# Patient Record
Sex: MALE | Race: WHITE | NOT HISPANIC OR LATINO | Employment: OTHER | URBAN - METROPOLITAN AREA
[De-identification: names, ages, dates, MRNs, and addresses within clinical notes are randomized per-mention and may not be internally consistent; named-entity substitution may affect disease eponyms.]

---

## 2023-04-13 LAB
ESTIMATED AVERAGE GLUCOSE FOR HBA1C: 120 MG/DL
HEMOGLOBIN A1C/HEMOGLOBIN TOTAL IN BLOOD: 5.8 %
THYROTROPIN (MIU/L) IN SER/PLAS BY DETECTION LIMIT <= 0.05 MIU/L: 1.91 MIU/L (ref 0.44–3.98)

## 2023-04-14 LAB
ALANINE AMINOTRANSFERASE (SGPT) (U/L) IN SER/PLAS: 26 U/L (ref 10–52)
ALBUMIN (G/DL) IN SER/PLAS: 4 G/DL (ref 3.4–5)
ALKALINE PHOSPHATASE (U/L) IN SER/PLAS: 91 U/L (ref 33–136)
ANION GAP IN SER/PLAS: 14 MMOL/L (ref 10–20)
ASPARTATE AMINOTRANSFERASE (SGOT) (U/L) IN SER/PLAS: 21 U/L (ref 9–39)
BILIRUBIN TOTAL (MG/DL) IN SER/PLAS: 0.6 MG/DL (ref 0–1.2)
CALCIUM (MG/DL) IN SER/PLAS: 9.4 MG/DL (ref 8.6–10.6)
CARBON DIOXIDE, TOTAL (MMOL/L) IN SER/PLAS: 24 MMOL/L (ref 21–32)
CHLORIDE (MMOL/L) IN SER/PLAS: 107 MMOL/L (ref 98–107)
CHOLESTEROL (MG/DL) IN SER/PLAS: 197 MG/DL (ref 0–199)
CHOLESTEROL IN HDL (MG/DL) IN SER/PLAS: 40.3 MG/DL
CHOLESTEROL/HDL RATIO: 4.9
CREATININE (MG/DL) IN SER/PLAS: 1.43 MG/DL (ref 0.5–1.3)
GFR MALE: 51 ML/MIN/1.73M2
GLUCOSE (MG/DL) IN SER/PLAS: 105 MG/DL (ref 74–99)
LDL: 95 MG/DL (ref 0–99)
NON HDL CHOLESTEROL: 157 MG/DL
POTASSIUM (MMOL/L) IN SER/PLAS: 4.7 MMOL/L (ref 3.5–5.3)
PROTEIN TOTAL: 6.6 G/DL (ref 6.4–8.2)
SODIUM (MMOL/L) IN SER/PLAS: 140 MMOL/L (ref 136–145)
TRIGLYCERIDE (MG/DL) IN SER/PLAS: 309 MG/DL (ref 0–149)
UREA NITROGEN (MG/DL) IN SER/PLAS: 22 MG/DL (ref 6–23)
VLDL: 62 MG/DL (ref 0–40)

## 2023-05-08 ENCOUNTER — HOSPITAL ENCOUNTER (OUTPATIENT)
Dept: DATA CONVERSION | Facility: HOSPITAL | Age: 73
End: 2023-05-08
Attending: SURGERY | Admitting: SURGERY
Payer: MEDICARE

## 2023-05-08 DIAGNOSIS — Z85.46 PERSONAL HISTORY OF MALIGNANT NEOPLASM OF PROSTATE: ICD-10-CM

## 2023-05-08 DIAGNOSIS — C4A.62 MERKEL CELL CARCINOMA OF LEFT UPPER LIMB, INCLUDING SHOULDER (MULTI): ICD-10-CM

## 2023-05-08 DIAGNOSIS — Z87.891 PERSONAL HISTORY OF NICOTINE DEPENDENCE: ICD-10-CM

## 2023-05-08 DIAGNOSIS — M19.90 UNSPECIFIED OSTEOARTHRITIS, UNSPECIFIED SITE: ICD-10-CM

## 2023-05-08 DIAGNOSIS — E78.5 HYPERLIPIDEMIA, UNSPECIFIED: ICD-10-CM

## 2023-05-08 DIAGNOSIS — E03.9 HYPOTHYROIDISM, UNSPECIFIED: ICD-10-CM

## 2023-05-08 DIAGNOSIS — E66.9 OBESITY, UNSPECIFIED: ICD-10-CM

## 2023-05-08 DIAGNOSIS — K21.9 GASTRO-ESOPHAGEAL REFLUX DISEASE WITHOUT ESOPHAGITIS: ICD-10-CM

## 2023-05-08 DIAGNOSIS — D50.9 IRON DEFICIENCY ANEMIA, UNSPECIFIED: ICD-10-CM

## 2023-05-08 DIAGNOSIS — N18.30 CHRONIC KIDNEY DISEASE, STAGE 3 UNSPECIFIED (MULTI): ICD-10-CM

## 2023-05-23 LAB
COMPLETE PATHOLOGY REPORT: NORMAL
CONVERTED CLINICAL DIAGNOSIS-HISTORY: NORMAL
CONVERTED DIAGNOSIS COMMENT: NORMAL
CONVERTED FINAL DIAGNOSIS: NORMAL
CONVERTED FINAL REPORT PDF LINK TO COPY AND PASTE: NORMAL
CONVERTED GROSS DESCRIPTION: NORMAL
CONVERTED MICROSCOPIC DESCRIPTION: NORMAL

## 2023-09-07 VITALS — BODY MASS INDEX: 28.63 KG/M2 | WEIGHT: 199.96 LBS | HEIGHT: 70 IN

## 2023-09-14 NOTE — H&P
History & Physical Reviewed:   I have reviewed the History and Physical dated:  02-May-2023   History and Physical reviewed and relevant findings noted. Patient examined to review pertinent physical  findings.: No significant changes   Home Medications Reviewed: no changes noted   Allergies Reviewed: no changes noted       ERAS (Enhanced Recovery After Surgery):  ·  ERAS Patient: no     Consent:   COVID-19 Consent:  ·  COVID-19 Risk Consent Surgeon has reviewed key risks related to the risk of anali COVID-19 and if they contract COVID-19 what the risks are.       Electronic Signatures:  Bhavin Payne)  (Signed 08-May-2023 11:22)   Authored: History & Physical Reviewed, ERAS, Consent,  Note Completion      Last Updated: 08-May-2023 11:22 by Bhavin Payne)

## 2023-09-30 PROBLEM — L03.119 CELLULITIS OF HAND: Status: ACTIVE | Noted: 2023-09-30

## 2023-09-30 PROBLEM — R71.8 ELEVATED HEMATOCRIT: Status: ACTIVE | Noted: 2023-09-30

## 2023-09-30 PROBLEM — E61.1 IRON DEFICIENCY: Status: ACTIVE | Noted: 2023-09-30

## 2023-09-30 PROBLEM — L57.9 SKIN CHANGES DUE TO CHRONIC EXPOSURE TO NONIONIZING RADIATION, UNSPECIFIED: Status: ACTIVE | Noted: 2023-07-05

## 2023-09-30 PROBLEM — N62 GYNECOMASTIA, MALE: Status: ACTIVE | Noted: 2023-09-30

## 2023-09-30 PROBLEM — L57.0 ACTINIC KERATOSIS: Status: ACTIVE | Noted: 2023-07-05

## 2023-09-30 PROBLEM — M85.80 OSTEOPENIA: Status: ACTIVE | Noted: 2023-09-30

## 2023-09-30 PROBLEM — L82.1 OTHER SEBORRHEIC KERATOSIS: Status: ACTIVE | Noted: 2023-07-05

## 2023-09-30 PROBLEM — R20.2 FACIAL PARESTHESIA: Status: ACTIVE | Noted: 2023-09-30

## 2023-09-30 PROBLEM — R42 POSTURAL DIZZINESS: Status: ACTIVE | Noted: 2023-09-30

## 2023-09-30 PROBLEM — R97.20 ELEVATED PSA: Status: ACTIVE | Noted: 2023-09-30

## 2023-09-30 PROBLEM — E03.9 HYPOTHYROID: Status: ACTIVE | Noted: 2023-09-30

## 2023-09-30 PROBLEM — N52.9 MALE ERECTILE DISORDER: Status: ACTIVE | Noted: 2023-09-30

## 2023-09-30 PROBLEM — N64.4 NIPPLE TENDERNESS: Status: ACTIVE | Noted: 2023-09-30

## 2023-09-30 PROBLEM — L21.9 SEBORRHEIC DERMATITIS, UNSPECIFIED: Status: ACTIVE | Noted: 2023-07-05

## 2023-09-30 PROBLEM — N63.0 MASS OF BREAST: Status: ACTIVE | Noted: 2023-09-30

## 2023-09-30 PROBLEM — D22.60 MELANOCYTIC NEVI OF UNSPECIFIED UPPER LIMB, INCLUDING SHOULDER: Status: ACTIVE | Noted: 2023-07-05

## 2023-09-30 PROBLEM — D75.1 POLYCYTHEMIA: Status: ACTIVE | Noted: 2023-09-30

## 2023-09-30 PROBLEM — C61 ADENOCARCINOMA OF PROSTATE (MULTI): Status: ACTIVE | Noted: 2023-09-30

## 2023-09-30 PROBLEM — D48.5 NEOPLASM OF UNCERTAIN BEHAVIOR OF SKIN: Status: ACTIVE | Noted: 2023-07-05

## 2023-09-30 PROBLEM — D22.39 MELANOCYTIC NEVI OF OTHER PARTS OF FACE: Status: ACTIVE | Noted: 2023-07-05

## 2023-09-30 PROBLEM — D22.4 MELANOCYTIC NEVI OF SCALP AND NECK: Status: ACTIVE | Noted: 2023-07-05

## 2023-09-30 PROBLEM — N18.31 CHRONIC KIDNEY DISEASE, STAGE 3A (MULTI): Status: ACTIVE | Noted: 2023-09-30

## 2023-09-30 PROBLEM — E29.1 HYPOGONADISM MALE: Status: ACTIVE | Noted: 2023-09-30

## 2023-09-30 PROBLEM — D22.5 MELANOCYTIC NEVI OF TRUNK: Status: ACTIVE | Noted: 2023-07-05

## 2023-09-30 PROBLEM — R73.09 ELEVATED GLUCOSE LEVEL: Status: ACTIVE | Noted: 2023-09-30

## 2023-09-30 PROBLEM — D22.70 MELANOCYTIC NEVI OF UNSPECIFIED LOWER LIMB, INCLUDING HIP: Status: ACTIVE | Noted: 2023-07-05

## 2023-09-30 PROBLEM — L82.0 INFLAMED SEBORRHEIC KERATOSIS: Status: ACTIVE | Noted: 2023-07-05

## 2023-09-30 PROBLEM — R79.9 ABNORMAL BLOOD CHEMISTRY: Status: RESOLVED | Noted: 2023-09-30 | Resolved: 2023-09-30

## 2023-09-30 PROBLEM — K21.9 GERD WITHOUT ESOPHAGITIS: Status: ACTIVE | Noted: 2023-09-30

## 2023-09-30 PROBLEM — Z85.828 PERSONAL HISTORY OF OTHER MALIGNANT NEOPLASM OF SKIN: Status: ACTIVE | Noted: 2023-07-05

## 2023-09-30 PROBLEM — R39.12 WEAK URINARY STREAM: Status: ACTIVE | Noted: 2023-09-30

## 2023-09-30 PROBLEM — E78.5 DYSLIPIDEMIA: Status: ACTIVE | Noted: 2023-09-30

## 2023-09-30 RX ORDER — PRAVASTATIN SODIUM 40 MG/1
40 TABLET ORAL NIGHTLY
COMMUNITY
Start: 2023-04-20 | End: 2023-10-30 | Stop reason: SDUPTHER

## 2023-09-30 RX ORDER — LEVOTHYROXINE SODIUM 75 UG/1
75 TABLET ORAL DAILY
COMMUNITY
Start: 2023-04-20 | End: 2023-10-30 | Stop reason: SDUPTHER

## 2023-09-30 RX ORDER — PETROLATUM,WHITE/LANOLIN
1 OINTMENT (GRAM) TOPICAL DAILY
COMMUNITY

## 2023-09-30 RX ORDER — CHOLECALCIFEROL (VITAMIN D3) 25 MCG
1 TABLET ORAL DAILY
COMMUNITY

## 2023-09-30 RX ORDER — HYDROCODONE BITARTRATE AND ACETAMINOPHEN 5; 325 MG/1; MG/1
1-2 TABLET ORAL
COMMUNITY
Start: 2022-12-14 | End: 2023-10-30 | Stop reason: ALTCHOICE

## 2023-09-30 RX ORDER — CEPHALEXIN 500 MG/1
500 CAPSULE ORAL 2 TIMES DAILY
COMMUNITY
Start: 2023-01-11 | End: 2023-10-30 | Stop reason: ALTCHOICE

## 2023-09-30 RX ORDER — LEVOTHYROXINE SODIUM 50 UG/1
1 TABLET ORAL DAILY
COMMUNITY
End: 2023-10-30

## 2023-10-02 NOTE — OP NOTE
PROCEDURE DETAILS    Preoperative Diagnosis:  Merkel Cell Carcinoma left forearm  Postoperative Diagnosis:  Same  Surgeon: Bhavin Payne  Resident/Fellow/Other Assistant: Chuck Hamilton    Procedure:  1. WIDE EXCISION OF MERKEL CELL CARCINOMA LEFT FOREARM WITH 2CM MARGIN  2. COMPLEX CLOSURE OF A 14CM WOUND  3. LEFT AXILLARY SENTINEL NODE BIOPSY    Anesthesia: GETA  Estimated Blood Loss: 5ml  Findings: Left proximal forearm biopsy site without gross evidence of residual disease  Prominent left axillary nodes on SLNB  Specimens(s) Collected: yes,  Left forearm long-term short prox 1200, long radial 900  Left Axillary SLN #1 - blue count 390, #2 - blue count 584   Complications: None  Patient Returned To/Condition: PACU        Operative Report:   Indications: Mr. Camilo is a 72-year-old male presenting with a left forearm skin lesion that was biopsied and consistent with Merkel cell carcinoma.  The  size of this lesion was reported to be less than 1 cm.  He had no clinically palpable lymphadenopathy.  The patient was recommended for wide excision of the primary lesion with a 2 cm margin due to the small size and the opportunity to avoid radiation  therapy if a larger margin was performed.  He was also recommended for sentinel lymph node biopsy.  He understood the risks, benefits, and alternatives to this plan.  Informed consent was obtained.  Lymphoscintigraphy and SPECT-CT was performed prior  to the operation demonstrating drainage to the left axilla only.    Description of the procedure:   The patient was brought to the operating room and placed supine on the table. Sequential compression devices were applied. General anesthesia was smoothly induced.  Isosulfan blue was injected intradermally in a peritumoral fashion.  The operative sites  were prepped and draped in the usual fashion. Timeout was performed and preoperative antibiotics given.    I began with the sentinel lymph node. Direction was turned to  the left axilla. Incision was made, dissection was carried deeply into the lymphatic basin. Using the probe, 2 prominent sentinel lymph nodes were identified and excised. Once all the sentinel  lymph nodes were removed, the wound was irrigated, hemostasis was assured, local anesthesia was infiltrated, and the wound was closed in layers. 3-0 vicryl was used for the Chaparro's fascia layer and the deep dermal layers. 4-0 monocryl  was used on the  skin. Surgical glue was applied as a dressing.    I now directed my attention to the left proximal volar forearm Merkel cell carcinoma.  2 cm. margins were marked around the primary lesion. The wound markings were elongated resulting in 5 x 14 cm. wound markings. Skin incision was made with a knife and  dissection carried to but not through the underlying muscular fascia. I now elevated skin and subcutaneous flaps by undermining 2 to 3 cm in all directions. The wound was irrigated and hemostasis was assured. The wound was closed in layers with vicryl  suture in the deep dermal layer, and monocryl to the skin.  Skin glue was used as a dressing.  At the end of the procedure, the arm was wrapped from the base of the fingers to the distal humerus with a Kerlix and Ace bandage.    The patient tolerated the procedure well without any apparent intraoperative complications. All sponge, needle, and instrument counts were correct.  As the attending surgeon I was scrubbed for all key portions of the procedure.  Note Recipients:   Colby Saenz, DO - [id number] []  ANDREA FALL                        Attestation:   Note Completion:  Attending Attestation I performed the procedure without a resident         Electronic Signatures:  Bhavin Payne)  (Signed 08-May-2023 13:46)   Authored: Post-Operative Note, Chart Review, Note Completion      Last Updated: 08-May-2023 13:46 by Bhavin Payne)

## 2023-10-04 ENCOUNTER — OFFICE VISIT (OUTPATIENT)
Dept: DERMATOLOGY | Facility: CLINIC | Age: 73
End: 2023-10-04
Payer: MEDICARE

## 2023-10-04 DIAGNOSIS — L82.0 INFLAMED SEBORRHEIC KERATOSIS: ICD-10-CM

## 2023-10-04 DIAGNOSIS — L72.0 EPIDERMOID CYST: Primary | ICD-10-CM

## 2023-10-04 DIAGNOSIS — L57.8 DIFFUSE PHOTODAMAGE OF SKIN: ICD-10-CM

## 2023-10-04 DIAGNOSIS — L81.4 LENTIGO: ICD-10-CM

## 2023-10-04 DIAGNOSIS — Z85.821 HISTORY OF MERKEL CELL CARCINOMA: ICD-10-CM

## 2023-10-04 DIAGNOSIS — L21.9 SEBORRHEIC DERMATITIS: ICD-10-CM

## 2023-10-04 DIAGNOSIS — L82.1 SEBORRHEIC KERATOSIS: ICD-10-CM

## 2023-10-04 PROCEDURE — 1036F TOBACCO NON-USER: CPT | Performed by: DERMATOLOGY

## 2023-10-04 PROCEDURE — 1159F MED LIST DOCD IN RCRD: CPT | Performed by: DERMATOLOGY

## 2023-10-04 PROCEDURE — 99214 OFFICE O/P EST MOD 30 MIN: CPT | Performed by: DERMATOLOGY

## 2023-10-04 PROCEDURE — 17110 DESTRUCTION B9 LES UP TO 14: CPT | Performed by: DERMATOLOGY

## 2023-10-04 ASSESSMENT — DERMATOLOGY QUALITY OF LIFE (QOL) ASSESSMENT
RATE HOW EMOTIONALLY BOTHERED YOU ARE BY YOUR SKIN PROBLEM (FOR EXAMPLE, WORRY, EMBARRASSMENT, FRUSTRATION): 0 - NEVER BOTHERED
RATE HOW BOTHERED YOU ARE BY SYMPTOMS OF YOUR SKIN PROBLEM (EG, ITCHING, STINGING BURNING, HURTING OR SKIN IRRITATION): 0 - NEVER BOTHERED
WHAT SINGLE SKIN CONDITION LISTED BELOW IS THE PATIENT ANSWERING THE QUALITY-OF-LIFE ASSESSMENT QUESTIONS ABOUT: NONE OF THE ABOVE
RATE HOW BOTHERED YOU ARE BY EFFECTS OF YOUR SKIN PROBLEMS ON YOUR ACTIVITIES (EG, GOING OUT, ACCOMPLISHING WHAT YOU WANT, WORK ACTIVITIES OR YOUR RELATIONSHIPS WITH OTHERS): 0 - NEVER BOTHERED

## 2023-10-04 ASSESSMENT — DERMATOLOGY PATIENT ASSESSMENT
DO YOU USE SUNSCREEN: OCCASIONALLY
HAVE YOU HAD OR DO YOU HAVE A STAPH INFECTION: NO
ARE YOU AN ORGAN TRANSPLANT RECIPIENT: NO
DO YOU USE A TANNING BED: NO
DO YOU HAVE ANY NEW OR CHANGING LESIONS: NO

## 2023-10-04 ASSESSMENT — ITCH NUMERIC RATING SCALE: HOW SEVERE IS YOUR ITCHING?: 0

## 2023-10-04 NOTE — PROGRESS NOTES
Subjective     Johnny Camilo is a 73 y.o. male who presents for the following: Suspicious Skin Lesion (Discussed recent biopsy result and management options.  He also notes a dry scalp and a raised, dry, scaly, itchy bump on his right scalp.).     Review of Systems:  No other skin or systemic complaints other than what is documented elsewhere in the note.    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 Jennifer Garrett at [facility], on 4/18/23 s/p wide local excision with 2-cm margins and positive left axillary sentinel lymph node biopsy (1 out of 1 lymph node positive for halfway) by Dr. Bhavin Payne on 5/8/23, followed by PET/CT/MRI with plans to initiate radiation therapy next week, according to the patient  - AKs  - mildly dysplastic compound nevus on left lateral proximal arm diagnosed at initial visit on 7/5/23  - h/o Prostate Cancer, for which he follows with Dr. Altman at [facility], according to the patient  - no prior history of skin cancer      Specialty Problems          Dermatology Problems    Actinic keratosis    Inflamed seborrheic keratosis    Melanocytic nevi of other parts of face    Melanocytic nevi of scalp and neck    Melanocytic nevi of trunk    Melanocytic nevi of unspecified lower limb, including hip    Melanocytic nevi of unspecified upper limb, including shoulder    Neoplasm of uncertain behavior of skin    Other seborrheic keratosis    Personal history of other malignant neoplasm of skin    Seborrheic dermatitis, unspecified    Skin changes due to chronic exposure to nonionizing radiation, unspecified        Objective   Well appearing patient in no apparent distress; mood and affect are within normal limits.    A focused skin examination was  performed. All findings within normal limits unless otherwise noted below.    Assessment/Plan   1. Epidermoid cyst  Left Frontal Scalp  On his left anterior superior temporal scalp, there is a pink, well-healed scar at his recent biopsy site    Biopsy-proven Epidermoid Cyst - left anterior superior temporal scalp.  The benign nature of this cystic lesion was discussed with the patient today and reassurance provided.  I discussed the possibility of undergoing surgical excision of the cyst for definitive removal, but after discussing the risks and benefits of the procedure, the patient expressed understanding and states he is not interested in having the lesion excised at this time.  Of note, his suture was removed in the office today as well.    2. Inflamed seborrheic keratosis  Mid Frontal Scalp  On the patient's right medial parietal scalp, there is a 9 mm erythematous and light brown-colored, hyperkeratotic, stuck-on appearing papule with a surrounding rim of erythema    Inflamed Seborrheic Keratosis - right medial parietal scalp.  The benign nature of this lesion was discussed with the patient today and reassurance provided.  Given the history the patient provides of frequent irritation and associated symptoms as well as its inflamed appearance on exam today, I offered to treat this lesion with liquid nitrogen cryotherapy.  The patient expressed understanding, is in agreement with this plan, and wishes to proceed with cryotherapy today.    Destr of lesion - Mid Frontal Scalp  Complexity: simple    Destruction method: cryotherapy    Informed consent: discussed and consent obtained    Lesion destroyed using liquid nitrogen: Yes    Cryotherapy cycles:  2  Outcome: patient tolerated procedure well with no complications    Post-procedure details: wound care instructions given      3. Seborrheic keratosis  Mid Forehead  Scattered on the patient's face, neck, and scalp, there are several tan- to light brown-colored,  hyperkeratotic, stuck-on appearing papules of varying size and shape    Seborrheic Keratoses - scattered on face, neck, and scalp.  The benign nature of these lesions was discussed with the patient today and reassurance provided.  No treatment is medically indicated for the noninflamed SKs at this time.    4. Seborrheic dermatitis  Mid Frontal Scalp  On the patient's scalp, there are pink, scaly patches with whitish-yellowish, greasy scale    Seborrheic Dermatitis - scalp.  The potentially chronic and intermittently flaring nature of this condition and treatment options were discussed extensively with the patient today.  At this time, I recommend topical anti-fungal therapy with Ketoconazole 2% shampoo, which the patient was instructed to use 2-3 days per week, alternating with over-the-counter anti-dandruff shampoos, such as Head & Shoulders, Selsun Blue, and Neutrogena T-gel, every month.  The risks, benefits, and side effects of this medication were discussed.  The patient expressed understanding and is in agreement with this plan.    5. Lentigo  Mid Tip of Nose  Multiple tan- to light brown-colored, round- to oval-shaped, symmetric and uniform-appearing macules and small patches consistent with lentigines scattered in sun-exposed areas.    Solar Lentigines and photodamage.  The clinically benign-appearing nature of these lesions and their relation to chronic sun exposure were discussed with the patient today and reassurance provided.  None of these lesions meet threshold for biopsy today, and thus no treatment is medically indicated for these lesions at this time.  The signs and symptoms of skin cancer were reviewed and the patient was advised to practice sun protection and sun avoidance, use daily sunscreen, and perform regular self skin exams.  The patient was instructed to monitor these lesions for any changes, such as in size, shape, or color, or associated symptoms and to call our office to schedule a  return visit for re-evaluation if any such changes or symptoms are noticed in the future.  The patient expressed understanding and is in agreement with this plan.    6. Diffuse photodamage of skin  Photodistributed  Diffuse photodamage with actinic changes with telangiectasia and mottled pigmentation in sun-exposed areas.    Photodamage.  The signs and symptoms of skin cancer were reviewed and the patient was advised to practice sun protection and sun avoidance, use daily sunscreen, and perform regular self skin exams.  Sun protection was discussed, including avoiding the mid-day sun, wearing a sunscreen with SPF at least 50, and stressing the need for reapplication of sunscreen and applying more than they think they need.    7. History of Merkel cell carcinoma  Left Forearm - Anterior  There is evidence of photodamage in sun-exposed areas.    History of Merkel cell carcinoma, dysplastic nevus, and actinic keratoses photodamage.  The patient was recently seen in our office for routine follow-up and total body skin exam on 9/27/23, at which time no evidence of recurrence was noted.  I emphasized the importance of continuing to perform monthly self-skin exams using the ABCDs of monitoring moles, which were reviewed with the patient, as well as the importance of sun avoidance and sun protection with daily sunscreen use.  I will have the patient return to our office in 3 months from the date of his last visit for routine follow-up and skin exam, and the patient was instructed to call our office should the patient notice any new, changing, symptomatic, or otherwise concerning skin lesions before then.  He was also instructed to continue to follow closely with his hematologist/oncologist for continued evaluation and management.  The patient expressed understanding and is in agreement with this plan.    Related Procedures  Follow Up In Dermatology - Established Patient

## 2023-10-13 ENCOUNTER — TELEPHONE (OUTPATIENT)
Dept: PRIMARY CARE | Facility: CLINIC | Age: 73
End: 2023-10-13
Payer: MEDICARE

## 2023-10-15 ENCOUNTER — TELEPHONE (OUTPATIENT)
Dept: URGENT CARE | Facility: CLINIC | Age: 73
End: 2023-10-15
Payer: MEDICARE

## 2023-10-15 DIAGNOSIS — E78.5 DYSLIPIDEMIA: Primary | ICD-10-CM

## 2023-10-15 DIAGNOSIS — E03.9 HYPOTHYROIDISM, UNSPECIFIED TYPE: ICD-10-CM

## 2023-10-15 DIAGNOSIS — R73.09 ELEVATED GLUCOSE LEVEL: ICD-10-CM

## 2023-10-15 NOTE — TELEPHONE ENCOUNTER
Looked in [facility]. Pt typically gets CMP, lipid panel, TSH, and A1c for Dx of hyperglycemia, dyslipidemia, and hypothyroidism. These orders were placed in the chart for pt.

## 2023-10-17 ENCOUNTER — APPOINTMENT (OUTPATIENT)
Dept: DERMATOLOGY | Facility: CLINIC | Age: 73
End: 2023-10-17
Payer: MEDICARE

## 2023-10-30 ENCOUNTER — OFFICE VISIT (OUTPATIENT)
Dept: PRIMARY CARE | Facility: CLINIC | Age: 73
End: 2023-10-30
Payer: MEDICARE

## 2023-10-30 ENCOUNTER — LAB (OUTPATIENT)
Dept: LAB | Facility: LAB | Age: 73
End: 2023-10-30
Payer: MEDICARE

## 2023-10-30 VITALS
BODY MASS INDEX: 32.93 KG/M2 | HEART RATE: 79 BPM | WEIGHT: 230 LBS | SYSTOLIC BLOOD PRESSURE: 128 MMHG | OXYGEN SATURATION: 97 % | TEMPERATURE: 97.9 F | HEIGHT: 70 IN | DIASTOLIC BLOOD PRESSURE: 70 MMHG

## 2023-10-30 DIAGNOSIS — E03.9 HYPOTHYROIDISM, UNSPECIFIED TYPE: ICD-10-CM

## 2023-10-30 DIAGNOSIS — R73.09 ELEVATED GLUCOSE LEVEL: ICD-10-CM

## 2023-10-30 DIAGNOSIS — E78.5 DYSLIPIDEMIA: ICD-10-CM

## 2023-10-30 LAB
ALBUMIN SERPL BCP-MCNC: 4.1 G/DL (ref 3.4–5)
ALP SERPL-CCNC: 72 U/L (ref 33–136)
ALT SERPL W P-5'-P-CCNC: 31 U/L (ref 10–52)
ANION GAP SERPL CALC-SCNC: 9 MMOL/L (ref 10–20)
AST SERPL W P-5'-P-CCNC: 23 U/L (ref 9–39)
BILIRUB SERPL-MCNC: 0.5 MG/DL (ref 0–1.2)
BUN SERPL-MCNC: 18 MG/DL (ref 6–23)
CALCIUM SERPL-MCNC: 9.2 MG/DL (ref 8.6–10.3)
CHLORIDE SERPL-SCNC: 106 MMOL/L (ref 98–107)
CHOLEST SERPL-MCNC: 187 MG/DL (ref 0–199)
CHOLESTEROL/HDL RATIO: 4.8
CO2 SERPL-SCNC: 25 MMOL/L (ref 21–32)
CREAT SERPL-MCNC: 1.26 MG/DL (ref 0.5–1.3)
GFR SERPL CREATININE-BSD FRML MDRD: 59 ML/MIN/1.73M*2
GLUCOSE SERPL-MCNC: 97 MG/DL (ref 74–99)
HDLC SERPL-MCNC: 39.3 MG/DL
LDLC SERPL CALC-MCNC: 102 MG/DL
NON HDL CHOLESTEROL: 148 MG/DL (ref 0–149)
POTASSIUM SERPL-SCNC: 4.4 MMOL/L (ref 3.5–5.3)
PROT SERPL-MCNC: 6.4 G/DL (ref 6.4–8.2)
SODIUM SERPL-SCNC: 136 MMOL/L (ref 136–145)
TRIGL SERPL-MCNC: 229 MG/DL (ref 0–149)
TSH SERPL-ACNC: 3.68 MIU/L (ref 0.44–3.98)
VLDL: 46 MG/DL (ref 0–40)

## 2023-10-30 PROCEDURE — 80053 COMPREHEN METABOLIC PANEL: CPT

## 2023-10-30 PROCEDURE — 80061 LIPID PANEL: CPT

## 2023-10-30 PROCEDURE — 84443 ASSAY THYROID STIM HORMONE: CPT

## 2023-10-30 PROCEDURE — 1159F MED LIST DOCD IN RCRD: CPT | Performed by: FAMILY MEDICINE

## 2023-10-30 PROCEDURE — 36415 COLL VENOUS BLD VENIPUNCTURE: CPT

## 2023-10-30 PROCEDURE — 83036 HEMOGLOBIN GLYCOSYLATED A1C: CPT

## 2023-10-30 PROCEDURE — 1036F TOBACCO NON-USER: CPT | Performed by: FAMILY MEDICINE

## 2023-10-30 PROCEDURE — 99214 OFFICE O/P EST MOD 30 MIN: CPT | Performed by: FAMILY MEDICINE

## 2023-10-30 RX ORDER — LEVOTHYROXINE SODIUM 75 UG/1
75 TABLET ORAL DAILY
Qty: 90 TABLET | Refills: 1 | Status: SHIPPED | OUTPATIENT
Start: 2023-10-30 | End: 2024-05-24 | Stop reason: SDUPTHER

## 2023-10-30 RX ORDER — PRAVASTATIN SODIUM 40 MG/1
40 TABLET ORAL NIGHTLY
Qty: 90 TABLET | Refills: 1 | Status: SHIPPED | OUTPATIENT
Start: 2023-10-30 | End: 2024-05-24 | Stop reason: SDUPTHER

## 2023-10-30 NOTE — PROGRESS NOTES
"Subjective   Patient ID: Johnny Camilo is a 73 y.o. male who presents for Med Refill (Is not fasting for labs).    HPI Pt is here for refills of medications to treat the following medical conditions. Unless otherwise stated, these conditions are well-controlled, pt is taking medications s Rx, and there are no reported side effects to medications or other treatment: Dyslipidemia, Hypothyroid. Elevated glucose level - Manages with diet and exercise.     Review of Systems   All other systems reviewed and are negative.      Objective   /70   Pulse 79   Temp 36.6 °C (97.9 °F)   Ht 1.778 m (5' 10\")   Wt 104 kg (230 lb)   SpO2 97%   BMI 33.00 kg/m²     Physical Exam  Constitutional:       Appearance: Normal appearance.   Eyes:      Conjunctiva/sclera: Conjunctivae normal.   Cardiovascular:      Rate and Rhythm: Normal rate and regular rhythm.   Pulmonary:      Effort: Pulmonary effort is normal.      Breath sounds: Normal breath sounds.   Abdominal:      Palpations: Abdomen is soft.   Musculoskeletal:         General: Normal range of motion.   Skin:     General: Skin is warm and dry.   Neurological:      Mental Status: He is alert.   Psychiatric:         Mood and Affect: Mood normal.       Assessment/Plan Chronic medications refilled per protocol.         "

## 2023-10-31 LAB
EST. AVERAGE GLUCOSE BLD GHB EST-MCNC: 123 MG/DL
HBA1C MFR BLD: 5.9 %

## 2024-01-10 ENCOUNTER — TELEPHONE (OUTPATIENT)
Dept: DERMATOLOGY | Facility: CLINIC | Age: 74
End: 2024-01-10

## 2024-01-10 ENCOUNTER — OFFICE VISIT (OUTPATIENT)
Dept: DERMATOLOGY | Facility: CLINIC | Age: 74
End: 2024-01-10
Payer: MEDICARE

## 2024-01-10 DIAGNOSIS — L57.8 DIFFUSE PHOTODAMAGE OF SKIN: ICD-10-CM

## 2024-01-10 DIAGNOSIS — L82.0 INFLAMED SEBORRHEIC KERATOSIS: Primary | ICD-10-CM

## 2024-01-10 DIAGNOSIS — B35.3 TINEA PEDIS OF RIGHT FOOT: ICD-10-CM

## 2024-01-10 DIAGNOSIS — L81.4 LENTIGO: ICD-10-CM

## 2024-01-10 DIAGNOSIS — L82.1 SEBORRHEIC KERATOSIS: ICD-10-CM

## 2024-01-10 DIAGNOSIS — L57.0 ACTINIC KERATOSIS: ICD-10-CM

## 2024-01-10 DIAGNOSIS — Z85.821 HISTORY OF MERKEL CELL CARCINOMA: ICD-10-CM

## 2024-01-10 DIAGNOSIS — D22.5 MELANOCYTIC NEVUS OF TRUNK: ICD-10-CM

## 2024-01-10 DIAGNOSIS — D18.01 HEMANGIOMA OF SKIN: ICD-10-CM

## 2024-01-10 PROCEDURE — 1036F TOBACCO NON-USER: CPT | Performed by: DERMATOLOGY

## 2024-01-10 PROCEDURE — 17004 DESTROY PREMAL LESIONS 15/>: CPT | Performed by: DERMATOLOGY

## 2024-01-10 PROCEDURE — 17110 DESTRUCTION B9 LES UP TO 14: CPT | Performed by: DERMATOLOGY

## 2024-01-10 PROCEDURE — 1159F MED LIST DOCD IN RCRD: CPT | Performed by: DERMATOLOGY

## 2024-01-10 PROCEDURE — 99214 OFFICE O/P EST MOD 30 MIN: CPT | Performed by: DERMATOLOGY

## 2024-01-10 ASSESSMENT — DERMATOLOGY PATIENT ASSESSMENT
DO YOU USE SUNSCREEN: DAILY
DO YOU USE A TANNING BED: NO
DO YOU HAVE ANY NEW OR CHANGING LESIONS: YES
WHERE ARE THESE NEW OR CHANGING LESIONS LOCATED: BACK
ARE YOU AN ORGAN TRANSPLANT RECIPIENT: NO

## 2024-01-10 ASSESSMENT — ITCH NUMERIC RATING SCALE
HOW SEVERE IS YOUR ITCHING?: 0
HOW SEVERE IS YOUR ITCHING?: 0

## 2024-01-10 ASSESSMENT — DERMATOLOGY QUALITY OF LIFE (QOL) ASSESSMENT: ARE THERE EXCLUSIONS OR EXCEPTIONS FOR THE QUALITY OF LIFE ASSESSMENT: NO

## 2024-01-10 NOTE — PROGRESS NOTES
Subjective     Johnny Camilo is a 73 y.o. male who presents for the following: Skin Check.     Review of Systems:  No other skin or systemic complaints other than what is documented elsewhere in the note.    The following portions of the chart were reviewed this encounter and updated as appropriate:       Skin Cancer History  No skin cancer on file.    Specialty Problems          Dermatology Problems    Actinic keratosis    Inflamed seborrheic keratosis    Melanocytic nevi of other parts of face    Melanocytic nevi of scalp and neck    Melanocytic nevi of trunk    Melanocytic nevi of unspecified lower limb, including hip    Melanocytic nevi of unspecified upper limb, including shoulder    Neoplasm of uncertain behavior of skin    Other seborrheic keratosis    Personal history of other malignant neoplasm of skin    Seborrheic dermatitis, unspecified    Skin changes due to chronic exposure to nonionizing radiation, unspecified     Past Medical History:  Johnny Camilo  has no past medical history on file.    Past Surgical History:  Johnny Camilo  has a past surgical history that includes Hernia repair (04/14/2022); Knee Arthroplasty (04/14/2022); Other surgical history (04/14/2022); Other surgical history (04/14/2022); and Other surgical history (04/14/2022).    Family History:  Patient family history is not on file.    Social History:  Johnny Camilo  reports that he has never smoked. He has never been exposed to tobacco smoke. He has never used smokeless tobacco. He reports that he does not drink alcohol and does not use drugs.    Allergies:  Patient has no known allergies.    Current Medications / CAM's:    Current Outpatient Medications:     cholecalciferol (Vitamin D-3) 25 MCG (1000 UT) tablet, Take 1 tablet (25 mcg) by mouth once daily., Disp: , Rfl:     glucosamine sulfate 500 mg capsule, Take 1 capsule by mouth once daily., Disp: , Rfl:     levothyroxine (Synthroid, Levoxyl) 75 mcg tablet,  Take 1 tablet (75 mcg) by mouth once daily., Disp: 90 tablet, Rfl: 1    pravastatin (Pravachol) 40 mg tablet, Take 1 tablet (40 mg) by mouth once daily at bedtime., Disp: 90 tablet, Rfl: 1    ZINC ORAL, Take 1 tablet by mouth once daily. Zinc 140 mg (as elemental zinc 50 mg) oral tablet, Disp: , Rfl:      Objective   Well appearing patient in no apparent distress; mood and affect are within normal limits.    A full examination was performed including scalp, head, eyes, ears, nose, lips, neck, chest, axillae, abdomen, back, buttocks, bilateral upper extremities, bilateral lower extremities, hands, feet, fingers, toes, fingernails, and toenails. All findings within normal limits unless otherwise noted below.    Assessment/Plan   1. History of Merkel cell carcinoma    Related Procedures  Follow Up In Dermatology - Established Patient

## 2024-01-10 NOTE — TELEPHONE ENCOUNTER
Pt left message wanting Dr Mccoy to return call to him forgot to ask a few questions ,,,, I returned call to pt he had a few questions , first wanted to let Dr know that he is allergic to Dove soap and uses Safegaurd soap , 2nd he uses Almay anti persprirant for yrs does not give him a rash like some others , and wanted to know what kind of moisterizer to use , I suggested Cereve or Eucirin , because of pt past Radiation treaments  he is very sensitive to many products

## 2024-01-10 NOTE — PROGRESS NOTES
Subjective     Johnny Camilo is a 73 y.o. male who presents for the following: Skin Check.  He notes a raised, flaky, itchy bump on the right side of his neck, which has been present for a few months and rubs on his shirts and itches, but it has not changed in any other way, including in size or color, and does not hurt or bleed.  He denies any other new, changing, symptomatic, or concerning skin lesions since his last visit.    Review of Systems:  No other skin or systemic complaints other than what is documented elsewhere in the note.    The following portions of the chart were reviewed this encounter and updated as appropriate:       Skin Cancer History  No skin cancer on file.    Specialty Problems          Dermatology Problems    Actinic keratosis    Inflamed seborrheic keratosis    Melanocytic nevi of other parts of face    Melanocytic nevi of scalp and neck    Melanocytic nevi of trunk    Melanocytic nevi of unspecified lower limb, including hip    Melanocytic nevi of unspecified upper limb, including shoulder    Neoplasm of uncertain behavior of skin    Other seborrheic keratosis    Personal history of other malignant neoplasm of skin    Seborrheic dermatitis, unspecified    Skin changes due to chronic exposure to nonionizing radiation, unspecified       Past Dermatologic / Past Relevant Medical History:    - 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 Jennifer Garrett at [facility], on 4/18/23 s/p wide local excision with 2-cm margins and positive left axillary sentinel lymph node biopsy (1 out of 1 lymph node positive for USP) by Dr. Bhavin Payne on 5/8/23, followed by PET/CT/MRI with plans to initiate radiation therapy next week, according to the patient  - AKs  - mildly dysplastic compound nevus on left lateral proximal arm diagnosed at initial visit on 7/5/23  - h/o Prostate Cancer, for  which he follows with Dr. 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    Current Medications / CAM's:    Current Outpatient Medications:     cholecalciferol (Vitamin D-3) 25 MCG (1000 UT) tablet, Take 1 tablet (25 mcg) by mouth once daily., Disp: , Rfl:     glucosamine sulfate 500 mg capsule, Take 1 capsule by mouth once daily., Disp: , Rfl:     levothyroxine (Synthroid, Levoxyl) 75 mcg tablet, Take 1 tablet (75 mcg) by mouth once daily., Disp: 90 tablet, Rfl: 1    pravastatin (Pravachol) 40 mg tablet, Take 1 tablet (40 mg) by mouth once daily at bedtime., Disp: 90 tablet, Rfl: 1    ZINC ORAL, Take 1 tablet by mouth once daily. Zinc 140 mg (as elemental zinc 50 mg) oral tablet, Disp: , Rfl:      Objective   Well appearing patient in no apparent distress; mood and affect are within normal limits.    A full examination was performed including scalp, face, eyes, ears, nose, lips, neck, chest, axillae, abdomen, back, bilateral upper extremities, and bilateral lower extremities. All findings within normal limits unless otherwise noted below.    Assessment/Plan   1. Inflamed seborrheic keratosis  Neck - Anterior  On the patient's right lateral inferior neck, there is a 6 mm erythematous and light brown-colored, hyperkeratotic, stuck-on appearing papule with a surrounding rim of erythema    Inflamed Seborrheic Keratosis -right lateral inferior neck.  The benign nature of this lesion was discussed with the patient today and reassurance provided.  Given the history the patient provides of frequent irritation and associated symptoms as well as its inflamed appearance on exam today, I offered to treat this lesion with liquid nitrogen  cryotherapy.  The patient expressed understanding, is in agreement with this plan, and wishes to proceed with cryotherapy today.    Destr of lesion - Neck - Anterior  Complexity: simple    Destruction method: cryotherapy    Informed consent: discussed and consent obtained    Lesion destroyed using liquid nitrogen: Yes    Cryotherapy cycles:  2  Outcome: patient tolerated procedure well with no complications    Post-procedure details: wound care instructions given      2. History of Merkel cell carcinoma  On his left forearm, there is a well-healed scar with no evidence of recurrent growth or any new lesions concerning for primary or recurrent half-way on exam today.    History of Merkel cell carcinoma, dysplastic nevus, and actinic keratoses photodamage.  There is no evidence of recurrence on exam today.  I emphasized the importance of continuing to perform monthly self-skin exams using the ABCDs of monitoring moles, which were reviewed with the patient, as well as the importance of sun avoidance and sun protection with daily sunscreen use.  I will have the patient return to our office in 3 months for routine follow-up and skin exam and the patient was instructed to call our office should the patient notice any new, changing, symptomatic, or otherwise concerning skin lesions before then.  He was also instructed to continue to follow closely with his hematologist/oncologist for continued evaluation and management.  The patient expressed understanding and is in agreement with this plan.    Related Procedures  Follow Up In Dermatology - Established Patient    3. Actinic keratosis (17)  Scattered on the patient's face, mainly his forehead, and scalp, there are multiple erythematous, gritty, scaly macules     Actinic Keratoses -scattered on face and scalp.  The pre-cancerous nature of these lesions and treatment options were discussed with the patient today.  At this time, I recommend treatment with liquid nitrogen  cryotherapy.  The patient expressed understanding, is in agreement with this plan, and wishes to proceed with cryotherapy today.    Destr of lesion  Complexity: simple    Destruction method: cryotherapy    Informed consent: discussed and consent obtained    Lesion destroyed using liquid nitrogen: Yes    Cryotherapy cycles:  1  Outcome: patient tolerated procedure well with no complications    Post-procedure details: wound care instructions given      4. Melanocytic nevus of trunk  Scattered on the patient's face, neck, trunk, and extremities, there are several small, round- to oval-shaped, brown-pigmented and pink-colored, symmetric, uniform-appearing macules and dome-shaped papules    Clinically benign- to slightly atypical-appearing nevi - the clinically benign- to slightly atypical-appearing nature of the patient's nevi was discussed with the patient today.  None of the patient's nevi meet threshold for biopsy today.  I emphasized the importance of performing monthly self-skin exams using the ABCDs of monitoring moles, which were reviewed with the patient today and an informational hand-out provided.  I also emphasized the importance of sun avoidance and sun protection with daily sunscreen use.  The patient expressed understanding and is in agreement with this plan.    5. Seborrheic keratosis  Scattered on the patient's face, neck, trunk, and extremities, there are multiple tan- to light brown-colored, hyperkeratotic, stuck-on appearing papules of varying size and shape    Seborrheic Keratoses - the benign nature of these lesions was discussed with the patient today and reassurance provided.  No treatment is medically indicated for the noninflamed SKs at this time.    6. Hemangioma of skin  Scattered on the patient's face, neck, trunk, and extremities, there are multiple small, round, cherry red- to purplish-colored, symmetric, uniform, vascular-appearing macules and papules    Cherry Angiomas - the benign nature  of these vascular lesions was discussed with the patient today and reassurance provided.  No treatment is medically indicated for these lesions at this time.    7. Lentigo  Photodistributed  Multiple tan- to light brown-colored, round- to oval-shaped, symmetric and uniform-appearing macules and small patches consistent with lentigines scattered in sun-exposed areas.    Solar Lentigines and photodamage.  The clinically benign-appearing nature of these lesions and their relation to chronic sun exposure were discussed with the patient today and reassurance provided.  None of these lesions meet threshold for biopsy today, and thus no treatment is medically indicated for these lesions at this time.  The signs and symptoms of skin cancer were reviewed and the patient was advised to practice sun protection and sun avoidance, use daily sunscreen, and perform regular self skin exams.  The patient was instructed to monitor these lesions for any changes, such as in size, shape, or color, or associated symptoms and to call our office to schedule a return visit for re-evaluation if any such changes or symptoms are noticed in the future.  The patient expressed understanding and is in agreement with this plan.    8. Tinea pedis of right foot  Right Foot - Anterior  On the patient's bilateral feet, there is moist scaling with maceration and fissures in the lateral webspaces and erythematous, scaly, thin plaques in a moccasin-type distribution on the soles and heels.    Tinea Pedis - flare on bilateral feet.  The fungal nature of this condition and treatment options were discussed extensively with the patient today.  At this time, I recommend topical anti-fungal therapy with Ketoconazole 2% cream, which the patient was instructed to apply twice daily to the affected areas of the feet for the next 3-4 weeks; the patient may repeat treatment in a similar fashion as needed for future flares.  The risks, benefits, and side effects of  this medication were discussed.  The patient expressed understanding and is in agreement with this plan.    9. Diffuse photodamage of skin  Photodistributed  Diffuse photodamage with actinic changes with telangiectasia and mottled pigmentation in sun-exposed areas.    Photodamage.  The signs and symptoms of skin cancer were reviewed and the patient was advised to practice sun protection and sun avoidance, use daily sunscreen, and perform regular self skin exams.  Sun protection was discussed, including avoiding the mid-day sun, wearing a sunscreen with SPF at least 50, and stressing the need for reapplication of sunscreen and applying more than they think they need.

## 2024-04-17 ENCOUNTER — OFFICE VISIT (OUTPATIENT)
Dept: DERMATOLOGY | Facility: CLINIC | Age: 74
End: 2024-04-17
Payer: MEDICARE

## 2024-04-17 DIAGNOSIS — Z85.821 HISTORY OF MERKEL CELL CARCINOMA: ICD-10-CM

## 2024-04-17 DIAGNOSIS — D22.5 MELANOCYTIC NEVUS OF TRUNK: ICD-10-CM

## 2024-04-17 DIAGNOSIS — L81.4 LENTIGO: ICD-10-CM

## 2024-04-17 DIAGNOSIS — D48.5 NEOPLASM OF UNCERTAIN BEHAVIOR OF SKIN: Primary | ICD-10-CM

## 2024-04-17 DIAGNOSIS — L57.0 ACTINIC KERATOSIS: ICD-10-CM

## 2024-04-17 DIAGNOSIS — L82.1 SEBORRHEIC KERATOSIS: ICD-10-CM

## 2024-04-17 DIAGNOSIS — L82.0 INFLAMED SEBORRHEIC KERATOSIS: ICD-10-CM

## 2024-04-17 DIAGNOSIS — L73.9 FOLLICULITIS: ICD-10-CM

## 2024-04-17 DIAGNOSIS — L57.8 DIFFUSE PHOTODAMAGE OF SKIN: ICD-10-CM

## 2024-04-17 PROCEDURE — 99214 OFFICE O/P EST MOD 30 MIN: CPT | Performed by: DERMATOLOGY

## 2024-04-17 PROCEDURE — 88305 TISSUE EXAM BY PATHOLOGIST: CPT | Performed by: DERMATOLOGY

## 2024-04-17 PROCEDURE — 11301 SHAVE SKIN LESION 0.6-1.0 CM: CPT | Performed by: DERMATOLOGY

## 2024-04-17 PROCEDURE — 17110 DESTRUCTION B9 LES UP TO 14: CPT | Performed by: DERMATOLOGY

## 2024-04-17 PROCEDURE — 1159F MED LIST DOCD IN RCRD: CPT | Performed by: DERMATOLOGY

## 2024-04-17 PROCEDURE — 17004 DESTROY PREMAL LESIONS 15/>: CPT | Performed by: DERMATOLOGY

## 2024-04-17 RX ORDER — CLINDAMYCIN PHOSPHATE 10 UG/ML
LOTION TOPICAL 2 TIMES DAILY
Qty: 60 ML | Refills: 11 | Status: SHIPPED | OUTPATIENT
Start: 2024-04-17 | End: 2025-04-17

## 2024-04-17 ASSESSMENT — ITCH NUMERIC RATING SCALE: HOW SEVERE IS YOUR ITCHING?: 0

## 2024-04-17 NOTE — PROGRESS NOTES
Subjective     Johnny Camilo is a 73 y.o. male who presents for the following: Skin Check.  He notes dark spot on his right thigh, which he states is new and has gotten darker in color.  It has not changed in any other way, including in size or shape, and it is asymptomatic with no associated bleeding, itching, or pain.  He also notes a raised, scaly bump on his right arm, which has been present for several months and has been itching recently.  It has not changed in any other way, including in size, shape, or color, and it does not hurt or bleed.  He also notes recent pimple breakouts on his chest.  He denies any other new, changing, or concerning skin lesions since his last visit; no bleeding, itching, or burning lesions.      Review of Systems:  No other skin or systemic complaints other than what is documented elsewhere in the note.    The following portions of the chart were reviewed this encounter and updated as appropriate:       Skin Cancer History  No skin cancer on file.    Specialty Problems          Dermatology Problems    Actinic keratosis    Inflamed seborrheic keratosis    Melanocytic nevi of other parts of face    Melanocytic nevi of scalp and neck    Melanocytic nevi of trunk    Melanocytic nevi of unspecified lower limb, including hip    Melanocytic nevi of unspecified upper limb, including shoulder    Neoplasm of uncertain behavior of skin    Other seborrheic keratosis    Personal history of other malignant neoplasm of skin    Seborrheic dermatitis, unspecified    Skin changes due to chronic exposure to nonionizing radiation, unspecified       Past Dermatologic / Past Relevant Medical History:    - 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 Jennifer Garrett at [facility], on 4/18/23 s/p wide local excision with 2-cm margins and positive left axillary sentinel lymph node biopsy (1  out of 1 lymph node positive for snf) by Dr. Bhavin Payne on 5/8/23, followed by PET/CT/MRI with plans to initiate radiation therapy next week, according to the patient  - AKs  - mildly dysplastic compound nevus on left lateral proximal arm diagnosed at initial visit on 7/5/23  - Tinea pedis  - h/o Prostate Cancer, for which he follows with Dr. 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    Current Medications / CAM's:    Current Outpatient Medications:     cholecalciferol (Vitamin D-3) 25 MCG (1000 UT) tablet, Take 1 tablet (25 mcg) by mouth once daily., Disp: , Rfl:     glucosamine sulfate 500 mg capsule, Take 1 capsule by mouth once daily., Disp: , Rfl:     levothyroxine (Synthroid, Levoxyl) 75 mcg tablet, Take 1 tablet (75 mcg) by mouth once daily., Disp: 90 tablet, Rfl: 1    pravastatin (Pravachol) 40 mg tablet, Take 1 tablet (40 mg) by mouth once daily at bedtime., Disp: 90 tablet, Rfl: 1    ZINC ORAL, Take 1 tablet by mouth once daily. Zinc 140 mg (as elemental zinc 50 mg) oral tablet, Disp: , Rfl:     clindamycin (Cleocin T) 1 % lotion, Apply topically 2 times a day., Disp: 60 mL, Rfl: 11     Objective   Well appearing patient in no apparent distress; mood and affect are within normal limits.    A full examination was performed including scalp, face, eyes, ears, nose, lips, neck, chest, axillae, abdomen, back, bilateral upper extremities, and bilateral lower extremities. All findings within normal limits unless otherwise noted below.    Assessment/Plan   1. Neoplasm of uncertain behavior of skin  Right Anterior Mid-Thigh  5 mm dark brown pigmented, asymmetric macule with an asymmetric pigment network and irregular  borders           Shave removal    Lesion length (cm):  0.5  Margin per side (cm):  0.2  Lesion diameter (cm):  0.9  Informed consent: discussed and consent obtained    Timeout: patient name, date of birth, surgical site, and procedure verified    Procedure prep:  Patient was prepped and draped  Anesthesia: the lesion was anesthetized in a standard fashion    Anesthetic:  1% lidocaine w/ epinephrine 1-100,000 local infiltration  Instrument used: flexible razor blade    Hemostasis achieved with: aluminum chloride    Outcome: patient tolerated procedure well    Post-procedure details: sterile dressing applied and wound care instructions given    Dressing type: bandage and petrolatum      Staff Communication: Dermatology Local Anesthesia: 1 % Lidocaine / Epinephrine - Amount:0.5ml    Specimen 1 - Dermatopathology- DERM LAB  Differential Diagnosis: DN  Check Margins Yes/No?:    Comments:    Dermpath Lab: Routine Histopathology (formalin-fixed tissue)    2. Inflamed seborrheic keratosis  Right Upper Arm - Anterior  On the patient's right anterior mid arm, there is a 9 mm erythematous and light brown-colored, hyperkeratotic, stuck-on appearing papule with a surrounding rim of erythema    Inflamed Seborrheic Keratosis -right anterior mid arm.  The benign nature of this lesion was discussed with the patient today and reassurance provided.  Given the history the patient provides of frequent irritation and associated symptoms as well as its inflamed appearance on exam today, I offered to treat this lesion with liquid nitrogen cryotherapy.  The patient expressed understanding, is in agreement with this plan, and wishes to proceed with cryotherapy today.    Destr of lesion - Right Upper Arm - Anterior  Complexity: simple    Destruction method: cryotherapy    Informed consent: discussed and consent obtained    Lesion destroyed using liquid nitrogen: Yes    Cryotherapy cycles:  2  Outcome: patient tolerated procedure well with no  complications    Post-procedure details: wound care instructions given      3. Actinic keratosis (16)  Head - Anterior (Face) (16)  Scattered on the patient's face and frontal scalp, there are multiple erythematous, gritty, scaly macules     Actinic Keratoses -scattered on face and frontal scalp.  The pre-cancerous nature of these lesions and treatment options were discussed with the patient today.  At this time, I recommend treatment with liquid nitrogen cryotherapy.  The patient expressed understanding, is in agreement with this plan, and wishes to proceed with cryotherapy today.    Destr of lesion - Head - Anterior (Face)  Complexity: simple    Destruction method: cryotherapy    Informed consent: discussed and consent obtained    Lesion destroyed using liquid nitrogen: Yes    Cryotherapy cycles:  1  Outcome: patient tolerated procedure well with no complications    Post-procedure details: wound care instructions given      4. Melanocytic nevus of trunk  Scattered on the patient's face, neck, trunk, and extremities, there are multiple small, round- to oval-shaped, brown-pigmented and pink-colored, symmetric, uniform-appearing macules and dome-shaped papules    Clinically benign- to slightly atypical-appearing nevi - the clinically benign- to slightly atypical-appearing nature of the remainder of the patient's nevi was discussed with the patient today.  None of the patient's nevi, with the exception of the one noted above, meet threshold for biopsy today.  I emphasized the importance of performing monthly self-skin exams using the ABCDs of monitoring moles, which were reviewed with the patient today and an informational hand-out provided.  I also emphasized the importance of sun avoidance and sun protection with daily sunscreen use.    5. Seborrheic keratosis  Scattered on the patient's face, neck, trunk, and extremities, there are multiple tan- to light brown-colored, hyperkeratotic, stuck-on appearing papules of  varying size and shape    Seborrheic Keratoses - the benign nature of these lesions was discussed with the patient today and reassurance provided.  No treatment is medically indicated for the noninflamed SKs at this time.    6. Lentigo  Photodistributed  Multiple tan- to light brown-colored, round- to oval-shaped, symmetric and uniform-appearing macules and small patches consistent with lentigines scattered in sun-exposed areas.    Solar Lentigines and photodamage.  The clinically benign-appearing nature of these lesions and their relation to chronic sun exposure were discussed with the patient today and reassurance provided.  None of these lesions meet threshold for biopsy today, and thus no treatment is medically indicated for these lesions at this time.  The signs and symptoms of skin cancer were reviewed and the patient was advised to practice sun protection and sun avoidance, use daily sunscreen, and perform regular self skin exams.  The patient was instructed to monitor these lesions for any changes, such as in size, shape, or color, or associated symptoms and to call our office to schedule a return visit for re-evaluation if any such changes or symptoms are noticed in the future.  The patient expressed understanding and is in agreement with this plan.    7. Folliculitis  Left Breast  Scattered on the patient's chest, there are several follicular-based erythematous, inflammatory papules and pustules    Folliculitis -flare on chest.  The bacterial nature of this condition and treatment options were discussed with the patient today.  At this time, I recommend topical antibiotic therapy with Clindamycin 1% lotion, which the patient was instructed to apply twice daily to the affected areas or up to 3-4 times per day as needed for active lesions.  The risks, benefits, and side effects of this medication were discussed.  The patient expressed understanding and is in agreement with this plan.    clindamycin (Cleocin  T) 1 % lotion - Left Breast  Apply topically 2 times a day.    8. History of Merkel cell carcinoma  On his left forearm, there is a well-healed scar with no evidence of recurrent growth or any new lesions concerning for primary or recurrent USP on exam today.  On his left lateral proximal arm, there is a well-healed scar with no evidence of recurrent growth or pigmentation on exam today.    History of Merkel cell carcinoma, dysplastic nevus, and actinic keratoses photodamage.  There is no evidence of recurrence on exam today.  I emphasized the importance of continuing to perform monthly self-skin exams using the ABCDs of monitoring moles, which were reviewed with the patient, as well as the importance of sun avoidance and sun protection with daily sunscreen use.  I will have the patient return to our office in 3 months, pending the above biopsy result, for routine follow-up and skin exam and the patient was instructed to call our office should the patient notice any new, changing, symptomatic, or otherwise concerning skin lesions before then.  He was also instructed to continue to follow closely with his hematologist/oncologist for continued evaluation and management.  The patient expressed understanding and is in agreement with this plan.    9. Diffuse photodamage of skin  Photodistributed  Diffuse photodamage with actinic changes with telangiectasia and mottled pigmentation in sun-exposed areas.    Photodamage.  The signs and symptoms of skin cancer were reviewed and the patient was advised to practice sun protection and sun avoidance, use daily sunscreen, and perform regular self skin exams.  Sun protection was discussed, including avoiding the mid-day sun, wearing a sunscreen with SPF at least 50, and stressing the need for reapplication of sunscreen and applying more than they think they need.

## 2024-04-22 LAB
LABORATORY COMMENT REPORT: NORMAL
PATH REPORT.FINAL DX SPEC: NORMAL
PATH REPORT.GROSS SPEC: NORMAL
PATH REPORT.MICROSCOPIC SPEC OTHER STN: NORMAL
PATH REPORT.RELEVANT HX SPEC: NORMAL
PATH REPORT.TOTAL CANCER: NORMAL

## 2024-05-13 ENCOUNTER — TELEPHONE (OUTPATIENT)
Dept: PRIMARY CARE | Facility: CLINIC | Age: 74
End: 2024-05-13
Payer: MEDICARE

## 2024-05-13 DIAGNOSIS — R73.09 ELEVATED GLUCOSE LEVEL: ICD-10-CM

## 2024-05-13 DIAGNOSIS — E78.5 DYSLIPIDEMIA: ICD-10-CM

## 2024-05-13 DIAGNOSIS — E03.9 HYPOTHYROIDISM, UNSPECIFIED TYPE: Primary | ICD-10-CM

## 2024-05-20 ENCOUNTER — LAB (OUTPATIENT)
Dept: LAB | Facility: LAB | Age: 74
End: 2024-05-20
Payer: MEDICARE

## 2024-05-20 DIAGNOSIS — R73.09 ELEVATED GLUCOSE LEVEL: ICD-10-CM

## 2024-05-20 DIAGNOSIS — E03.9 HYPOTHYROIDISM, UNSPECIFIED TYPE: ICD-10-CM

## 2024-05-20 DIAGNOSIS — E78.5 DYSLIPIDEMIA: ICD-10-CM

## 2024-05-20 LAB
ALBUMIN SERPL BCP-MCNC: 4 G/DL (ref 3.4–5)
ALP SERPL-CCNC: 71 U/L (ref 33–136)
ALT SERPL W P-5'-P-CCNC: 24 U/L (ref 10–52)
ANION GAP SERPL CALC-SCNC: 11 MMOL/L
AST SERPL W P-5'-P-CCNC: 19 U/L (ref 9–39)
BILIRUB SERPL-MCNC: 0.7 MG/DL (ref 0–1.2)
BUN SERPL-MCNC: 25 MG/DL (ref 6–23)
CALCIUM SERPL-MCNC: 9 MG/DL (ref 8.6–10.6)
CHLORIDE SERPL-SCNC: 107 MMOL/L (ref 98–107)
CHOLEST SERPL-MCNC: 169 MG/DL (ref 0–199)
CHOLESTEROL/HDL RATIO: 4.7
CO2 SERPL-SCNC: 24 MMOL/L (ref 21–32)
CREAT SERPL-MCNC: 1.53 MG/DL (ref 0.5–1.3)
EGFRCR SERPLBLD CKD-EPI 2021: 47 ML/MIN/1.73M*2
EST. AVERAGE GLUCOSE BLD GHB EST-MCNC: 114 MG/DL
GLUCOSE SERPL-MCNC: 118 MG/DL (ref 74–99)
HBA1C MFR BLD: 5.6 %
HDLC SERPL-MCNC: 35.8 MG/DL
LDLC SERPL CALC-MCNC: 91 MG/DL
NON HDL CHOLESTEROL: 133 MG/DL (ref 0–149)
POTASSIUM SERPL-SCNC: 4.4 MMOL/L (ref 3.5–5.3)
PROT SERPL-MCNC: 6.2 G/DL (ref 6.4–8.2)
SODIUM SERPL-SCNC: 138 MMOL/L (ref 136–145)
TRIGL SERPL-MCNC: 210 MG/DL (ref 0–149)
TSH SERPL-ACNC: 1.26 MIU/L (ref 0.44–3.98)
VLDL: 42 MG/DL (ref 0–40)

## 2024-05-20 PROCEDURE — 36415 COLL VENOUS BLD VENIPUNCTURE: CPT

## 2024-05-20 PROCEDURE — 80053 COMPREHEN METABOLIC PANEL: CPT

## 2024-05-20 PROCEDURE — 84443 ASSAY THYROID STIM HORMONE: CPT

## 2024-05-20 PROCEDURE — 83036 HEMOGLOBIN GLYCOSYLATED A1C: CPT

## 2024-05-20 PROCEDURE — 80061 LIPID PANEL: CPT

## 2024-05-28 ENCOUNTER — TELEPHONE (OUTPATIENT)
Dept: PRIMARY CARE | Facility: CLINIC | Age: 74
End: 2024-05-28
Payer: MEDICARE

## 2024-07-23 ENCOUNTER — APPOINTMENT (OUTPATIENT)
Dept: DERMATOLOGY | Facility: CLINIC | Age: 74
End: 2024-07-23
Payer: MEDICARE

## 2024-07-23 DIAGNOSIS — D22.5 MELANOCYTIC NEVUS OF TRUNK: ICD-10-CM

## 2024-07-23 DIAGNOSIS — L73.9 FOLLICULITIS: ICD-10-CM

## 2024-07-23 DIAGNOSIS — L57.0 ACTINIC KERATOSIS: ICD-10-CM

## 2024-07-23 DIAGNOSIS — L81.4 LENTIGO: ICD-10-CM

## 2024-07-23 DIAGNOSIS — D48.5 NEOPLASM OF UNCERTAIN BEHAVIOR OF SKIN: Primary | ICD-10-CM

## 2024-07-23 DIAGNOSIS — L82.1 SEBORRHEIC KERATOSIS: ICD-10-CM

## 2024-07-23 DIAGNOSIS — L57.8 DIFFUSE PHOTODAMAGE OF SKIN: ICD-10-CM

## 2024-07-23 DIAGNOSIS — Z85.821 HISTORY OF MERKEL CELL CARCINOMA: ICD-10-CM

## 2024-07-23 PROCEDURE — 11301 SHAVE SKIN LESION 0.6-1.0 CM: CPT | Performed by: DERMATOLOGY

## 2024-07-23 PROCEDURE — 99214 OFFICE O/P EST MOD 30 MIN: CPT | Performed by: DERMATOLOGY

## 2024-07-23 PROCEDURE — 1159F MED LIST DOCD IN RCRD: CPT | Performed by: DERMATOLOGY

## 2024-07-23 PROCEDURE — 11102 TANGNTL BX SKIN SINGLE LES: CPT | Performed by: DERMATOLOGY

## 2024-07-23 PROCEDURE — 17004 DESTROY PREMAL LESIONS 15/>: CPT | Performed by: DERMATOLOGY

## 2024-07-24 NOTE — PROGRESS NOTES
Subjective     Johnny Camilo is a 73 y.o. male who presents for the following: Skin Check.  He notes intermittent pimple breakouts on his shoulders.  He denies any new, changing, or concerning skin lesions since his last visit; no bleeding, itching, or burning lesions.      Review of Systems:  No other skin or systemic complaints other than what is documented elsewhere in the note.    The following portions of the chart were reviewed this encounter and updated as appropriate:       Skin Cancer History  No skin cancer on file.    Specialty Problems          Dermatology Problems    Actinic keratosis    Inflamed seborrheic keratosis    Melanocytic nevi of other parts of face    Melanocytic nevi of scalp and neck    Melanocytic nevi of trunk    Melanocytic nevi of unspecified lower limb, including hip    Melanocytic nevi of unspecified upper limb, including shoulder    Neoplasm of uncertain behavior of skin    Other seborrheic keratosis    Personal history of other malignant neoplasm of skin    Seborrheic dermatitis, unspecified    Skin changes due to chronic exposure to nonionizing radiation, unspecified       Past Dermatologic / Past Relevant Medical History:    - 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 Jennifer Garrett at [facility], on 4/18/23 s/p wide local excision with 2-cm margins and positive left axillary sentinel lymph node biopsy (1 out of 1 lymph node positive for care home) by Dr. Bhavin Payne on 5/8/23, followed by radiation therapy completed in August 2023, according to the patient  - AKs  - mildly dysplastic compound nevus on left lateral proximal arm diagnosed at initial visit on 7/5/23  - Tinea pedis  - h/o Prostate Cancer, for which he follows with Dr. 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    Current Medications / CAM's:    Current Outpatient Medications:     cholecalciferol (Vitamin D-3) 25 MCG (1000 UT) tablet, Take 1 tablet (25 mcg) by mouth once daily., Disp: , Rfl:     clindamycin (Cleocin T) 1 % lotion, Apply topically 2 times a day., Disp: 60 mL, Rfl: 11    glucosamine sulfate 500 mg capsule, Take 1 capsule by mouth once daily., Disp: , Rfl:     levothyroxine (Synthroid, Levoxyl) 75 mcg tablet, Take 1 tablet (75 mcg) by mouth once daily., Disp: 90 tablet, Rfl: 1    pravastatin (Pravachol) 40 mg tablet, Take 1 tablet (40 mg) by mouth once daily at bedtime., Disp: 90 tablet, Rfl: 1    ZINC ORAL, Take 1 tablet by mouth once daily. Zinc 140 mg (as elemental zinc 50 mg) oral tablet, Disp: , Rfl:      Objective   Well appearing patient in no apparent distress; mood and affect are within normal limits.    A full examination was performed including scalp, face, eyes, ears, nose, lips, neck, chest, axillae, abdomen, back, bilateral upper extremities, and bilateral lower extremities. All findings within normal limits unless otherwise noted below.    Assessment/Plan   1. Neoplasm of uncertain behavior of skin (2)  Right Distal Shoulder  1 cm pink, shiny papule           Lesion biopsy  Type of biopsy: tangential    Informed consent: discussed and consent obtained    Timeout: patient name, date of birth, surgical site, and procedure verified    Procedure prep:  Patient was prepped and draped  Anesthesia: the lesion was anesthetized in a standard fashion    Anesthetic:  1% lidocaine w/ epinephrine 1-100,000 local infiltration  Instrument used: flexible razor blade    Hemostasis achieved with: aluminum chloride    Outcome: patient tolerated procedure well    Post-procedure details: wound care instructions  given      Staff Communication: Dermatology Local Anesthesia: 1 % Lidocaine / Epinephrine - Amount:0.5ml    Specimen 1 - Dermatopathology- DERM LAB  Differential Diagnosis: BLK v BCC v SCCIS  Check Margins Yes/No?:    Comments:    Dermpath Lab: Routine Histopathology (formalin-fixed tissue)    Right Posterior Mid-Thigh  6 mm dark brown pigmented, asymmetric macule with an asymmetric pigment network and irregular borders           Shave removal    Lesion length (cm):  0.6  Margin per side (cm):  0.2  Lesion diameter (cm):  1  Informed consent: discussed and consent obtained    Timeout: patient name, date of birth, surgical site, and procedure verified    Procedure prep:  Patient was prepped and draped  Anesthesia: the lesion was anesthetized in a standard fashion    Anesthetic:  1% lidocaine w/ epinephrine 1-100,000 local infiltration  Instrument used: flexible razor blade    Hemostasis achieved with: aluminum chloride    Outcome: patient tolerated procedure well    Post-procedure details: sterile dressing applied and wound care instructions given    Dressing type: bandage and petrolatum      Specimen 2 - Dermatopathology- DERM LAB  Differential Diagnosis: DN  Check Margins Yes/No?:    Comments:    Dermpath Lab: Routine Histopathology (formalin-fixed tissue)    2. Actinic keratosis (17)  Head - Anterior (Face) (17)  Scattered on the patient's face, there are multiple erythematous, gritty, scaly macules     Actinic Keratoses -scattered on face.  The pre-cancerous nature of these lesions and treatment options were discussed with the patient today.  At this time, I recommend treatment with liquid nitrogen cryotherapy.  The patient expressed understanding, is in agreement with this plan, and wishes to proceed with cryotherapy today.    Destr of lesion - Head - Anterior (Face) (17)  Complexity: simple    Destruction method: cryotherapy    Informed consent: discussed and consent obtained    Lesion destroyed using liquid  nitrogen: Yes    Cryotherapy cycles:  1  Outcome: patient tolerated procedure well with no complications    Post-procedure details: wound care instructions given      3. Melanocytic nevus of trunk  Scattered on the patient's face, neck, trunk, and extremities, there are several small, round- to oval-shaped, brown-pigmented and pink-colored, symmetric, uniform-appearing macules and dome-shaped papules    Clinically benign- to slightly atypical-appearing nevi - the clinically benign- to slightly atypical-appearing nature of the patient's nevi was discussed with the patient today.  None of the patient's nevi meet threshold for biopsy today.  I emphasized the importance of performing monthly self-skin exams using the ABCDs of monitoring moles, which were reviewed with the patient today and an informational hand-out provided.  I also emphasized the importance of sun avoidance and sun protection with daily sunscreen use.  The patient expressed understanding and is in agreement with this plan.    4. Seborrheic keratosis  Scattered on the patient's face, neck, trunk, and extremities, there are multiple tan- to light brown-colored, hyperkeratotic, stuck-on appearing papules of varying size and shape    Seborrheic Keratoses - the benign nature of these lesions was discussed with the patient today and reassurance provided.  No treatment is medically indicated for these lesions at this time.    5. Lentigo  Photodistributed  Multiple tan- to light brown-colored, round- to oval-shaped, symmetric and uniform-appearing macules and small patches consistent with lentigines scattered in sun-exposed areas.    Solar Lentigines and photodamage.  The clinically benign-appearing nature of these lesions and their relation to chronic sun exposure were discussed with the patient today and reassurance provided.  None of these lesions meet threshold for biopsy today, and thus no treatment is medically indicated for these lesions at this time.   The signs and symptoms of skin cancer were reviewed and the patient was advised to practice sun protection and sun avoidance, use daily sunscreen, and perform regular self skin exams.  The patient was instructed to monitor these lesions for any changes, such as in size, shape, or color, or associated symptoms and to call our office to schedule a return visit for re-evaluation if any such changes or symptoms are noticed in the future.  The patient expressed understanding and is in agreement with this plan.    6. Folliculitis  Left Breast  Scattered on the patient's chest, there are several follicular-based erythematous, inflammatory papules and pustules    Folliculitis -flare on back and bilateral shoulders.  The bacterial nature of this condition and treatment options were discussed with the patient today.  At this time, I recommend topical antibiotic therapy with Clindamycin 1% lotion, which the patient was instructed to apply twice daily to the affected areas or up to 3-4 times per day as needed for active lesions.  The risks, benefits, and side effects of this medication were discussed.  The patient expressed understanding and is in agreement with this plan.    Related Medications  clindamycin (Cleocin T) 1 % lotion  Apply topically 2 times a day.    7. History of Merkel cell carcinoma  On his left forearm, there is a well-healed scar with no evidence of recurrent growth or any new lesions concerning for primary or recurrent senior care on exam today.  On his left lateral proximal arm, there is a well-healed scar with no evidence of recurrent growth or pigmentation on exam today.    History of Merkel cell carcinoma, dysplastic nevus, and actinic keratoses photodamage.  There is no evidence of recurrence on exam today.  I emphasized the importance of continuing to perform monthly self-skin exams using the ABCDs of monitoring moles, which were reviewed with the patient, as well as the importance of sun avoidance and sun  protection with daily sunscreen use.  I will have the patient return to our office in 3 months, pending the above biopsy results, for routine follow-up and skin exam and the patient was instructed to call our office should the patient notice any new, changing, symptomatic, or otherwise concerning skin lesions before then.  He was also instructed to continue to follow closely with his hematologist/oncologist for continued evaluation and management.  The patient expressed understanding and is in agreement with this plan.    8. Diffuse photodamage of skin  Photodistributed  Diffuse photodamage with actinic changes with telangiectasia and mottled pigmentation in sun-exposed areas.    Photodamage.  The signs and symptoms of skin cancer were reviewed and the patient was advised to practice sun protection and sun avoidance, use daily sunscreen, and perform regular self skin exams.  Sun protection was discussed, including avoiding the mid-day sun, wearing a sunscreen with SPF at least 50, and stressing the need for reapplication of sunscreen and applying more than they think they need.

## 2024-10-29 ENCOUNTER — APPOINTMENT (OUTPATIENT)
Dept: DERMATOLOGY | Facility: CLINIC | Age: 74
End: 2024-10-29
Payer: MEDICARE

## 2024-10-29 DIAGNOSIS — L57.0 ACTINIC KERATOSIS: ICD-10-CM

## 2024-10-29 DIAGNOSIS — L57.8 DIFFUSE PHOTODAMAGE OF SKIN: ICD-10-CM

## 2024-10-29 DIAGNOSIS — L81.4 LENTIGO: ICD-10-CM

## 2024-10-29 DIAGNOSIS — L73.9 FOLLICULITIS: ICD-10-CM

## 2024-10-29 DIAGNOSIS — L82.1 SEBORRHEIC KERATOSIS: ICD-10-CM

## 2024-10-29 DIAGNOSIS — D48.5 NEOPLASM OF UNCERTAIN BEHAVIOR OF SKIN: Primary | ICD-10-CM

## 2024-10-29 DIAGNOSIS — D22.5 MELANOCYTIC NEVUS OF TRUNK: ICD-10-CM

## 2024-10-29 DIAGNOSIS — Z85.821 HISTORY OF MERKEL CELL CARCINOMA: ICD-10-CM

## 2024-10-29 RX ORDER — CLINDAMYCIN PHOSPHATE 10 UG/ML
LOTION TOPICAL 2 TIMES DAILY
Qty: 60 ML | Refills: 11 | Status: SHIPPED | OUTPATIENT
Start: 2024-10-29 | End: 2025-10-29

## 2024-11-05 LAB
LAB AP ASR DISCLAIMER: NORMAL
LABORATORY COMMENT REPORT: NORMAL
PATH REPORT.FINAL DX SPEC: NORMAL
PATH REPORT.GROSS SPEC: NORMAL
PATH REPORT.MICROSCOPIC SPEC OTHER STN: NORMAL
PATH REPORT.RELEVANT HX SPEC: NORMAL
PATH REPORT.TOTAL CANCER: NORMAL

## 2024-12-06 ENCOUNTER — LAB (OUTPATIENT)
Dept: LAB | Facility: LAB | Age: 74
End: 2024-12-06
Payer: MEDICARE

## 2024-12-06 ENCOUNTER — TELEPHONE (OUTPATIENT)
Dept: PRIMARY CARE | Facility: CLINIC | Age: 74
End: 2024-12-06

## 2024-12-06 DIAGNOSIS — E03.9 HYPOTHYROIDISM, UNSPECIFIED TYPE: ICD-10-CM

## 2024-12-06 DIAGNOSIS — R73.09 ELEVATED GLUCOSE LEVEL: ICD-10-CM

## 2024-12-06 DIAGNOSIS — Z12.5 SCREENING FOR PROSTATE CANCER: ICD-10-CM

## 2024-12-06 DIAGNOSIS — E78.5 DYSLIPIDEMIA: ICD-10-CM

## 2024-12-06 LAB
ALBUMIN SERPL BCP-MCNC: 4.2 G/DL (ref 3.4–5)
ALP SERPL-CCNC: 87 U/L (ref 33–136)
ALT SERPL W P-5'-P-CCNC: 26 U/L (ref 10–52)
ANION GAP SERPL CALC-SCNC: 14 MMOL/L (ref 10–20)
AST SERPL W P-5'-P-CCNC: 20 U/L (ref 9–39)
BILIRUB SERPL-MCNC: 0.8 MG/DL (ref 0–1.2)
BUN SERPL-MCNC: 14 MG/DL (ref 6–23)
CALCIUM SERPL-MCNC: 9.8 MG/DL (ref 8.6–10.6)
CHLORIDE SERPL-SCNC: 104 MMOL/L (ref 98–107)
CHOLEST SERPL-MCNC: 191 MG/DL (ref 0–199)
CHOLESTEROL/HDL RATIO: 4.9
CO2 SERPL-SCNC: 29 MMOL/L (ref 21–32)
CREAT SERPL-MCNC: 1.49 MG/DL (ref 0.5–1.3)
EGFRCR SERPLBLD CKD-EPI 2021: 48 ML/MIN/1.73M*2
EST. AVERAGE GLUCOSE BLD GHB EST-MCNC: 120 MG/DL
GLUCOSE SERPL-MCNC: 111 MG/DL (ref 74–99)
HBA1C MFR BLD: 5.8 %
HDLC SERPL-MCNC: 38.9 MG/DL
LDLC SERPL CALC-MCNC: 118 MG/DL
NON HDL CHOLESTEROL: 152 MG/DL (ref 0–149)
POTASSIUM SERPL-SCNC: 4.5 MMOL/L (ref 3.5–5.3)
PROT SERPL-MCNC: 7 G/DL (ref 6.4–8.2)
PSA SERPL-MCNC: <0.1 NG/ML
SODIUM SERPL-SCNC: 142 MMOL/L (ref 136–145)
TRIGL SERPL-MCNC: 170 MG/DL (ref 0–149)
TSH SERPL-ACNC: 4.24 MIU/L (ref 0.44–3.98)
VLDL: 34 MG/DL (ref 0–40)

## 2024-12-06 PROCEDURE — 80061 LIPID PANEL: CPT

## 2024-12-06 PROCEDURE — 84443 ASSAY THYROID STIM HORMONE: CPT

## 2024-12-06 PROCEDURE — G0103 PSA SCREENING: HCPCS

## 2024-12-06 PROCEDURE — 83036 HEMOGLOBIN GLYCOSYLATED A1C: CPT

## 2024-12-06 PROCEDURE — 80053 COMPREHEN METABOLIC PANEL: CPT

## 2024-12-06 PROCEDURE — 36415 COLL VENOUS BLD VENIPUNCTURE: CPT

## 2024-12-09 ENCOUNTER — APPOINTMENT (OUTPATIENT)
Dept: PRIMARY CARE | Facility: CLINIC | Age: 74
End: 2024-12-09
Payer: MEDICARE

## 2024-12-09 VITALS
TEMPERATURE: 98.1 F | SYSTOLIC BLOOD PRESSURE: 104 MMHG | BODY MASS INDEX: 35.31 KG/M2 | HEART RATE: 65 BPM | HEIGHT: 69 IN | WEIGHT: 238.4 LBS | DIASTOLIC BLOOD PRESSURE: 60 MMHG | OXYGEN SATURATION: 95 %

## 2024-12-09 DIAGNOSIS — E66.01 OBESITY, MORBID (MULTI): ICD-10-CM

## 2024-12-09 DIAGNOSIS — I77.810 THORACIC AORTIC ECTASIA (CMS-HCC): ICD-10-CM

## 2024-12-09 DIAGNOSIS — E78.5 DYSLIPIDEMIA: Primary | ICD-10-CM

## 2024-12-09 DIAGNOSIS — N18.31 CHRONIC KIDNEY DISEASE, STAGE 3A (MULTI): ICD-10-CM

## 2024-12-09 DIAGNOSIS — E03.9 HYPOTHYROIDISM, UNSPECIFIED TYPE: ICD-10-CM

## 2024-12-09 DIAGNOSIS — C4A.9 MERKEL CELL CARCINOMA: ICD-10-CM

## 2024-12-09 PROCEDURE — 1159F MED LIST DOCD IN RCRD: CPT | Performed by: FAMILY MEDICINE

## 2024-12-09 PROCEDURE — 99214 OFFICE O/P EST MOD 30 MIN: CPT | Performed by: FAMILY MEDICINE

## 2024-12-09 PROCEDURE — 1123F ACP DISCUSS/DSCN MKR DOCD: CPT | Performed by: FAMILY MEDICINE

## 2024-12-09 PROCEDURE — 1036F TOBACCO NON-USER: CPT | Performed by: FAMILY MEDICINE

## 2024-12-09 PROCEDURE — 1158F ADVNC CARE PLAN TLK DOCD: CPT | Performed by: FAMILY MEDICINE

## 2024-12-09 RX ORDER — PRAVASTATIN SODIUM 40 MG/1
40 TABLET ORAL NIGHTLY
Qty: 90 TABLET | Refills: 1 | Status: SHIPPED | OUTPATIENT
Start: 2024-12-09 | End: 2025-06-07

## 2024-12-09 RX ORDER — LEVOTHYROXINE SODIUM 75 UG/1
75 TABLET ORAL DAILY
Qty: 90 TABLET | Refills: 1 | Status: SHIPPED | OUTPATIENT
Start: 2024-12-09 | End: 2025-06-07

## 2024-12-09 ASSESSMENT — PATIENT HEALTH QUESTIONNAIRE - PHQ9
2. FEELING DOWN, DEPRESSED OR HOPELESS: NOT AT ALL
SUM OF ALL RESPONSES TO PHQ9 QUESTIONS 1 AND 2: 0
1. LITTLE INTEREST OR PLEASURE IN DOING THINGS: NOT AT ALL

## 2024-12-09 NOTE — ASSESSMENT & PLAN NOTE
Continues following with Dr. Altman. Pt requested examination of his lymph nodes today. No abnormalities.

## 2024-12-09 NOTE — ASSESSMENT & PLAN NOTE
LDL and non-HDL slightly elevated compared with last readings; however, will not adjust medication at this time. We will recheck in 6 months.

## 2024-12-09 NOTE — ASSESSMENT & PLAN NOTE
TSH was slightly elevated today. Will recheck 2-3 months to see if it is trending upward to determine if we need to adjust medication. Will keep dosing the same for now.

## 2024-12-09 NOTE — PROGRESS NOTES
"Subjective   Patient ID: Konrad Camilo is a 74 y.o. male who presents for Med Refill.    HPI     Pt is here for refills of medications to treat the following medical conditions. Unless otherwise stated, these conditions are well-controlled, pt is taking medications as Rx, and there are no reported side effects to medications or other treatment: Dyslipidemia, Hypothyroidism.     Review of Systems   All other systems reviewed and are negative.      Objective   /60 (BP Location: Left arm, Patient Position: Sitting)   Pulse 65   Temp 36.7 °C (98.1 °F) (Oral)   Ht 1.74 m (5' 8.5\")   Wt 108 kg (238 lb 6.4 oz)   SpO2 95%   BMI 35.72 kg/m²     Physical Exam  Vitals reviewed.   Constitutional:       General: He is awake.      Appearance: Normal appearance. He is well-developed.   HENT:      Head: Normocephalic and atraumatic.   Cardiovascular:      Rate and Rhythm: Normal rate and regular rhythm.   Pulmonary:      Effort: Pulmonary effort is normal.      Breath sounds: Normal breath sounds.   Musculoskeletal:      Cervical back: Full passive range of motion without pain.      Right lower leg: No edema.      Left lower leg: No edema.   Lymphadenopathy:      Head:      Right side of head: No submental, submandibular, preauricular, posterior auricular or occipital adenopathy.      Left side of head: No submental, submandibular, preauricular, posterior auricular or occipital adenopathy.      Cervical: No cervical adenopathy.      Upper Body:      Right upper body: No supraclavicular, axillary or pectoral adenopathy.      Left upper body: No supraclavicular, axillary or pectoral adenopathy.   Skin:     General: Skin is warm and dry.      Findings: No lesion or rash.   Neurological:      General: No focal deficit present.      Mental Status: He is alert and oriented to person, place, and time.      Cranial Nerves: No facial asymmetry.      Motor: Motor function is intact.      Gait: Gait is intact.   Psychiatric:    "      Attention and Perception: Attention normal.         Mood and Affect: Mood and affect normal.       Assessment/Plan   Problem List Items Addressed This Visit             ICD-10-CM    Chronic kidney disease, stage 3a (Multi) N18.31     CMP reviewed - GFR/BUN/Cr stable. Improved slightly from last CMP 6 months ago.         Dyslipidemia - Primary E78.5     LDL and non-HDL slightly elevated compared with last readings; however, will not adjust medication at this time. We will recheck in 6 months.         Relevant Medications    pravastatin (Pravachol) 40 mg tablet    Hypothyroid E03.9     TSH was slightly elevated today. Will recheck 2-3 months to see if it is trending upward to determine if we need to adjust medication. Will keep dosing the same for now.         Relevant Medications    levothyroxine (Synthroid, Levoxyl) 75 mcg tablet    Other Relevant Orders    TSH    T4, free    Merkel cell carcinoma C4A.9     Continues following with Dr. Altman. Pt requested examination of his lymph nodes today. No abnormalities.         Thoracic aortic ectasia (CMS-HCC) I77.810     Pt reports that this is monitored via routine imaging.         Obesity, morbid (Multi) E66.01     Reviewed all labs with pt - copy of labs given per pt request   RTC 6 months for refills, sooner if needed

## 2025-01-10 ENCOUNTER — APPOINTMENT (OUTPATIENT)
Dept: DERMATOLOGY | Facility: CLINIC | Age: 75
End: 2025-01-10
Payer: MEDICARE

## 2025-01-10 DIAGNOSIS — D48.5 NEOPLASM OF UNCERTAIN BEHAVIOR OF SKIN: ICD-10-CM

## 2025-01-10 PROCEDURE — 11422 EXC H-F-NK-SP B9+MARG 1.1-2: CPT | Performed by: STUDENT IN AN ORGANIZED HEALTH CARE EDUCATION/TRAINING PROGRAM

## 2025-01-10 NOTE — PROGRESS NOTES
"Excision Operative Note    Date of Surgery:  1/10/2025  Surgeon:  Todd Snowden MD  Office Location: [id number] [facility] [address]  [facility] [address]  Dept: [phone]  Dept Fax: [phone]  Referring Provider: Bo Mcocy MD  [address] [place] [address]   [facility], [facility] [address]  [place] [address]    Subjective   Johnny Camilo \"Konrad\" is a 74 y.o. male who presents for the following: Excision for neoplasm of uncertain behavior of skin (atypical junctional melanocytic proliferation) on the left proximal dorsal hand.     According to the patient, the lesion has been present for approximately 6 months at the time of diagnosis.  The lesion is not causing symptoms.  The lesion has not been treated previously.    The patient does not have a pacemaker / defibrillator.  The patient does not have a heart valve / joint replacement.    The patient is not on blood thinners.   The patient does not have a history of hepatitis B or C.  The patient does not have a history of HIV.  The patient does have a history of immunosuppression (e.g. organ transplantation, malignancy, medications)    Is it okay to leave a phone message with results? yes  Who else may we leave results with: (name, relationship) n/a    The following portions of the chart were reviewed this encounter and updated as appropriate:         Assessment/Plan   Pre-procedure:   Obtained informed consent: written from patient  The surgical site was identified and confirmed with the patient.     Intra-operative:   Audible time out called at : 2:40 PM 01/10/25  by: Anjali Malcolm RN   Verified patient name, birthdate, site, specimen bottle label & requisition.    The planned procedure(s) was again reviewed with the patient. The risks of bleeding, infection, nerve damage and scarring were reviewed. The patient identity, surgical site, and planned procedure(s) were verified.     Biopsy Accession Number: [id number]  Neoplasm " of uncertain behavior of skin  Left Proximal Dorsal Hand    Skin excision    Lesion length (cm):  0.6  Lesion width (cm):  0.6  Margin per side (cm):  0.5  Total excision diameter (cm):  1.6  Informed consent: discussed and consent obtained    Timeout: patient name, date of birth, surgical site, and procedure verified    Procedure prep:  Patient prepped in sterile fashion  Anesthesia: the lesion was anesthetized in a standard fashion    Anesthetic:  1% lidocaine w/ epinephrine 1-100,000 local infiltration  Instrument used: #15 blade    Hemostasis achieved with: electrodesiccation    Outcome: patient tolerated procedure well with no complications    Post-procedure details: sterile dressing applied and wound care instructions given    Dressing type: pressure dressing, Ace wrap and Gelfoam    Additional details:  That nature of the diagnosis was explained. The lesion is benign but has features concerning for the potential to progress to melanoma and complete excision is therefore recommended.     Excision was recommended and discussed with the patient. The risks, benefits, and potential adverse effects were reviewed and the patient voiced understanding. Discussion included but was not limited to the cure rate, relative cost, wound care requirements, activity restrictions, and time to heal. Reconstruction options, risks, and benefits were reviewed including second intention healing, and linear repair (4-1 ratio was explained).  Possible outcomes were reviewed including likely scar appearance, infection, bleeding and the need for revision surgery.    Staff Communication: Dermatology Local Anesthesia: 1 % Lidocaine / Epinephrine - Amount: 2.2ml    Repair: After a discussion with the patient regarding the options for wound closure, a decision was made to proceed with second intention healing.  Dressing F/U: Surgifoam was placed in the wound. A pressure dressing was placed to help stabilize the wound and to minimize the  risk of postoperative bleeding. Wound care was discussed, and the patient was given written post-operative wound care instructions.      Specimen 1 - Dermatopathology- DERM LAB  Differential Diagnosis: atypical junctional melanocytic proliferation ([id number])  Check Margins Yes/No?:  yes  Comments: 5 mm margins, indication stitch at 12 o'clock proximal  Dermpath Lab: Routine Histopathology (formalin-fixed tissue)          The patient will follow up with Todd Snowden MD as needed for any post operative problems or concerns, and will follow up with their primary dermatologist as scheduled.

## 2025-01-20 LAB
LABORATORY COMMENT REPORT: NORMAL
PATH REPORT.FINAL DX SPEC: NORMAL
PATH REPORT.GROSS SPEC: NORMAL
PATH REPORT.RELEVANT HX SPEC: NORMAL
PATH REPORT.TOTAL CANCER: NORMAL

## 2025-02-09 ENCOUNTER — PATIENT MESSAGE (OUTPATIENT)
Dept: URGENT CARE | Facility: CLINIC | Age: 75
End: 2025-02-09
Payer: MEDICARE

## 2025-02-09 DIAGNOSIS — E03.9 HYPOTHYROIDISM, UNSPECIFIED TYPE: Primary | ICD-10-CM

## 2025-02-09 LAB
T4 FREE SERPL-MCNC: 1.3 NG/DL (ref 0.8–1.8)
TSH SERPL-ACNC: 4.81 MIU/L (ref 0.4–4.5)

## 2025-02-18 ENCOUNTER — APPOINTMENT (OUTPATIENT)
Dept: DERMATOLOGY | Facility: CLINIC | Age: 75
End: 2025-02-18
Payer: MEDICARE

## 2025-02-18 DIAGNOSIS — D18.01 HEMANGIOMA OF SKIN: ICD-10-CM

## 2025-02-18 DIAGNOSIS — L82.0 INFLAMED SEBORRHEIC KERATOSIS: ICD-10-CM

## 2025-02-18 DIAGNOSIS — L82.1 SEBORRHEIC KERATOSIS: ICD-10-CM

## 2025-02-18 DIAGNOSIS — L57.8 DIFFUSE PHOTODAMAGE OF SKIN: ICD-10-CM

## 2025-02-18 DIAGNOSIS — L57.0 ACTINIC KERATOSIS: ICD-10-CM

## 2025-02-18 DIAGNOSIS — D22.5 MELANOCYTIC NEVUS OF TRUNK: ICD-10-CM

## 2025-02-18 DIAGNOSIS — D48.5 NEOPLASM OF UNCERTAIN BEHAVIOR OF SKIN: Primary | ICD-10-CM

## 2025-02-18 DIAGNOSIS — L21.9 SEBORRHEIC DERMATITIS: ICD-10-CM

## 2025-02-18 DIAGNOSIS — Z85.821 HISTORY OF MERKEL CELL CARCINOMA: ICD-10-CM

## 2025-02-18 PROCEDURE — 11311 SHAVE SKIN LESION 0.6-1.0 CM: CPT | Performed by: DERMATOLOGY

## 2025-02-18 PROCEDURE — 99214 OFFICE O/P EST MOD 30 MIN: CPT | Performed by: DERMATOLOGY

## 2025-02-18 PROCEDURE — 11306 SHAVE SKIN LESION 0.6-1.0 CM: CPT | Performed by: DERMATOLOGY

## 2025-02-18 PROCEDURE — 17004 DESTROY PREMAL LESIONS 15/>: CPT | Performed by: DERMATOLOGY

## 2025-02-18 PROCEDURE — 1159F MED LIST DOCD IN RCRD: CPT | Performed by: DERMATOLOGY

## 2025-02-18 PROCEDURE — 17110 DESTRUCTION B9 LES UP TO 14: CPT | Performed by: DERMATOLOGY

## 2025-02-18 RX ORDER — LEVOTHYROXINE SODIUM 88 UG/1
88 TABLET ORAL DAILY
Qty: 30 TABLET | Refills: 3 | Status: SHIPPED | OUTPATIENT
Start: 2025-02-18 | End: 2025-06-18

## 2025-02-18 RX ORDER — KETOCONAZOLE 20 MG/ML
SHAMPOO, SUSPENSION TOPICAL
Qty: 120 ML | Refills: 11 | Status: SHIPPED | OUTPATIENT
Start: 2025-02-18

## 2025-02-18 ASSESSMENT — DERMATOLOGY PATIENT ASSESSMENT
DO YOU USE A TANNING BED: NO
DO YOU HAVE ANY NEW OR CHANGING LESIONS: NO

## 2025-02-18 ASSESSMENT — DERMATOLOGY QUALITY OF LIFE (QOL) ASSESSMENT: ARE THERE EXCLUSIONS OR EXCEPTIONS FOR THE QUALITY OF LIFE ASSESSMENT: NO

## 2025-06-13 ENCOUNTER — TELEPHONE (OUTPATIENT)
Dept: PRIMARY CARE | Facility: CLINIC | Age: 75
End: 2025-06-13
Payer: MEDICARE

## 2025-06-13 DIAGNOSIS — E78.5 DYSLIPIDEMIA: ICD-10-CM

## 2025-06-13 DIAGNOSIS — E03.9 HYPOTHYROIDISM, UNSPECIFIED TYPE: ICD-10-CM

## 2025-06-13 DIAGNOSIS — Z79.899 MEDICATION MANAGEMENT: ICD-10-CM

## 2025-06-13 DIAGNOSIS — R73.09 ELEVATED GLUCOSE LEVEL: ICD-10-CM

## 2025-06-13 DIAGNOSIS — Z85.46 HISTORY OF PROSTATE CANCER: ICD-10-CM

## 2025-06-16 ENCOUNTER — TELEPHONE (OUTPATIENT)
Dept: PRIMARY CARE | Facility: CLINIC | Age: 75
End: 2025-06-16
Payer: MEDICARE

## 2025-06-17 ENCOUNTER — APPOINTMENT (OUTPATIENT)
Dept: DERMATOLOGY | Facility: CLINIC | Age: 75
End: 2025-06-17
Payer: MEDICARE

## 2025-06-17 DIAGNOSIS — L57.8 DIFFUSE PHOTODAMAGE OF SKIN: ICD-10-CM

## 2025-06-17 DIAGNOSIS — D22.5 MELANOCYTIC NEVUS OF TRUNK: ICD-10-CM

## 2025-06-17 DIAGNOSIS — Z85.821 HISTORY OF MERKEL CELL CARCINOMA: ICD-10-CM

## 2025-06-17 DIAGNOSIS — D48.5 NEOPLASM OF UNCERTAIN BEHAVIOR OF SKIN: Primary | ICD-10-CM

## 2025-06-17 DIAGNOSIS — L82.1 SEBORRHEIC KERATOSIS: ICD-10-CM

## 2025-06-17 DIAGNOSIS — L57.0 ACTINIC KERATOSIS: ICD-10-CM

## 2025-06-17 DIAGNOSIS — D18.01 HEMANGIOMA OF SKIN: ICD-10-CM

## 2025-06-17 DIAGNOSIS — L30.9 DERMATITIS: ICD-10-CM

## 2025-06-17 PROCEDURE — 17004 DESTROY PREMAL LESIONS 15/>: CPT | Performed by: DERMATOLOGY

## 2025-06-17 PROCEDURE — 11102 TANGNTL BX SKIN SINGLE LES: CPT | Performed by: DERMATOLOGY

## 2025-06-17 PROCEDURE — 99214 OFFICE O/P EST MOD 30 MIN: CPT | Performed by: DERMATOLOGY

## 2025-06-17 PROCEDURE — 11301 SHAVE SKIN LESION 0.6-1.0 CM: CPT | Performed by: DERMATOLOGY

## 2025-06-17 PROCEDURE — 1159F MED LIST DOCD IN RCRD: CPT | Performed by: DERMATOLOGY

## 2025-06-17 RX ORDER — TRIAMCINOLONE ACETONIDE 1 MG/G
CREAM TOPICAL
Qty: 80 G | Refills: 1 | Status: SHIPPED | OUTPATIENT
Start: 2025-06-17

## 2025-06-17 ASSESSMENT — DERMATOLOGY PATIENT ASSESSMENT
DO YOU HAVE ANY NEW OR CHANGING LESIONS: NO
DO YOU USE A TANNING BED: NO

## 2025-06-17 ASSESSMENT — DERMATOLOGY QUALITY OF LIFE (QOL) ASSESSMENT: ARE THERE EXCLUSIONS OR EXCEPTIONS FOR THE QUALITY OF LIFE ASSESSMENT: NO

## 2025-06-17 NOTE — Clinical Note
On the patient's bilateral anterior thighs and popliteal fossae, there are similar-appearing ill-defined erythematous, scaly, slightly lichenified, thin plaques

## 2025-06-17 NOTE — Clinical Note
Eczema - flare on bilateral anterior thighs and popliteal fossae.  The potentially chronic and intermittently flaring nature of this condition and treatment options were discussed extensively with the patient today.  At this time, I recommend topical steroid therapy with Triamcinolone 0.1% cream, which the patient was instructed to apply twice daily to the affected areas of his lower extremities (avoid face, groin, body folds) for the next 2 weeks, followed by taper to twice daily on weekends only for persistent areas; the patient may repeat treatment in a 2-week burst-and-taper fashion every 6-8 weeks as needed for future flares.  I also emphasized the importance of dry, sensitive skin care, including the use of a mild soap, such as Dove, and frequent and aggressive moisturization, at least twice daily and immediately following showers or baths, with recommended over-the-counter moisturizing creams, such as Eucerin, Cetaphil, Cerave, or Aveeno, or Vaseline or Aquaphor ointments.  The risks, benefits, and side effects of this medication, including possible skin atrophy with overuse of topical steroids, were discussed.  The patient expressed understanding and is in agreement with this plan.

## 2025-06-17 NOTE — Clinical Note
On his left forearm, there is a well-healed scar with no evidence of recurrent growth or any new lesions concerning for primary or recurrent halfway on exam today.  On his left lateral proximal arm and left proximal dorsal hand, there are well-healed scars with no evidence of recurrent growth or pigmentation on exam today.

## 2025-06-17 NOTE — PROGRESS NOTES
"Solomon Camilo \"Konrad\" is a 74 y.o. male who presents for the following: Skin Check.  He states he has had red, dry, itchy areas on the fronts of his thighs and the backs of his knees for the past 3 months.  He has tried using aloe vera, desonide ointment, and triple antibiotic ointment, but the rash has not resolved.  He denies any new, changing, or concerning skin lesions since his last visit; no bleeding, itching, or burning lesions.      Review of Systems:  No other skin or systemic complaints other than what is documented elsewhere in the note.    The following portions of the chart were reviewed this encounter and updated as appropriate:       Skin Cancer History  Biopsy Log Book  No skin cancers from Specimen Tracking.    Additional History      Specialty Problems          Dermatology Problems    Actinic keratosis    Inflamed seborrheic keratosis    Melanocytic nevi of other parts of face    Melanocytic nevi of scalp and neck    Melanocytic nevi of trunk    Melanocytic nevi of unspecified lower limb, including hip    Melanocytic nevi of unspecified upper limb, including shoulder    Neoplasm of uncertain behavior of skin    Other seborrheic keratosis    Personal history of other malignant neoplasm of skin    Seborrheic dermatitis, unspecified    Skin changes due to chronic exposure to nonionizing radiation, unspecified       Past Dermatologic / Past Relevant Medical History:    - 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 Jennifer Garrett at [facility], on 4/18/23 s/p wide local excision with 2-cm margins and positive left axillary sentinel lymph node biopsy (1 out of 1 lymph node positive for half-way) by Dr. Bhavin Payne on 5/8/23, followed by radiation therapy completed in August 2023, according to the patient    - AKs    - atypical junctional melanocytic proliferation on left " proximal dorsal hand diagnosed on 10/29/24 s/p wide local excision with 5-mm margins by Dr. Snowden on 1/10/25  - mildly dysplastic compound nevus on left lateral proximal arm diagnosed at initial visit on 7/5/23    - Tinea pedis  - folliculitis  - h/o Prostate Cancer, for which he follows with Dr. Altman at 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    Current Medications / CAM's:  Current Medications[1]     Objective   Well appearing patient in no apparent distress; mood and affect are within normal limits.    A full examination was performed including scalp, face, eyes, ears, nose, lips, neck, chest, axillae, abdomen, back, bilateral upper extremities, and bilateral lower extremities. All findings within normal limits unless otherwise noted below.    Assessment/Plan   Skin Exam  1. NEOPLASM OF UNCERTAIN BEHAVIOR OF SKIN (3)  Mid-Frontal Scalp  2 cm dark brown pigmented, asymmetric patch with an asymmetric pigment network and irregular borders     Lesion biopsy  Type of biopsy: tangential    Informed consent: discussed and consent obtained    Timeout: patient name, date of birth, surgical site, and procedure verified    Procedure prep:  Patient was prepped and draped  Anesthesia: the lesion was anesthetized in a standard fashion    Anesthetic:  1% lidocaine w/ epinephrine 1-100,000 local infiltration  Instrument used: flexible razor blade    Hemostasis achieved with: aluminum chloride    Outcome: patient tolerated procedure well    Post-procedure details: wound care instructions given      Staff Communication: Dermatology  Local Anesthesia: 1 % Lidocaine / Epinephrine - Amount:0.5ml  Specimen 1 - Dermatopathology- DERM LAB  Differential Diagnosis: lentigo v SK v AMH  Check Margins Yes/No?:    Comments:    Dermpath Lab: Routine Histopathology (formalin-fixed tissue)  Right Medial Lower Back  4 mm dark brown pigmented, asymmetric macule with an asymmetric pigment network and irregular borders     Shave removal    Lesion length (cm):  0.4  Margin per side (cm):  0.2  Lesion diameter (cm):  0.8  Informed consent: discussed and consent obtained    Timeout: patient name, date of birth, surgical site, and procedure verified    Procedure prep:  Patient was prepped and draped  Anesthesia: the lesion was anesthetized in a standard fashion    Anesthetic:  1% lidocaine w/ epinephrine 1-100,000 local infiltration  Instrument used: flexible razor blade    Hemostasis achieved with: aluminum chloride    Outcome: patient tolerated procedure well    Post-procedure details: sterile dressing applied and wound care instructions given    Dressing type: bandage and petrolatum      Staff Communication: Dermatology Local Anesthesia: 1 % Lidocaine / Epinephrine - Amount:0.5ml  Specimen 2 - Dermatopathology- DERM LAB  Differential Diagnosis: DN  Check Margins Yes/No?:    Comments:    Dermpath Lab: Routine Histopathology (formalin-fixed tissue)  Left Posterior Lateral Distal Arm  8 mm erythematous, scaly papule     Shave removal    Lesion length (cm):  0.8  Margin per side (cm):  0  Lesion diameter (cm):  0.8  Informed consent: discussed and consent obtained    Timeout: patient name, date of birth, surgical site, and procedure verified    Procedure prep:  Patient was prepped and draped  Anesthesia: the lesion was anesthetized in a standard fashion    Anesthetic:  1% lidocaine w/ epinephrine 1-100,000 local infiltration  Instrument used: flexible razor blade    Hemostasis achieved with: aluminum chloride    Outcome: patient tolerated procedure well     Post-procedure details: sterile dressing applied and wound care instructions given    Dressing type: bandage and petrolatum      Staff Communication: Dermatology Local Anesthesia: 1 % Lidocaine / Epinephrine - Amount:0.5ml  Specimen 3 - Dermatopathology- DERM LAB  Differential Diagnosis: ISK v SCCIS v BCC  Check Margins Yes/No?:    Comments:    Dermpath Lab: Routine Histopathology (formalin-fixed tissue)  2. ACTINIC KERATOSIS (16)  Head - Anterior (Face) (16)  Scattered on the patient's face and scalp, there are multiple erythematous, gritty, scaly macules   Actinic Keratoses -scattered on face and scalp.  The pre-cancerous nature of these lesions and treatment options were discussed with the patient today.  At this time, I recommend treatment with liquid nitrogen cryotherapy.  The patient expressed understanding, is in agreement with this plan, and wishes to proceed with cryotherapy today.  Destr of lesion - Head - Anterior (Face) (16)  Complexity: simple    Destruction method: cryotherapy    Informed consent: discussed and consent obtained    Lesion destroyed using liquid nitrogen: Yes    Cryotherapy cycles:  1  Outcome: patient tolerated procedure well with no complications    Post-procedure details: wound care instructions given    3. DERMATITIS  Right Thigh - Anterior  On the patient's bilateral anterior thighs and popliteal fossae, there are similar-appearing ill-defined erythematous, scaly, slightly lichenified, thin plaques  Eczema - flare on bilateral anterior thighs and popliteal fossae.  The potentially chronic and intermittently flaring nature of this condition and treatment options were discussed extensively with the patient today.  At this time, I recommend topical steroid therapy with Triamcinolone 0.1% cream, which the patient was instructed to apply twice daily to the affected areas of his lower extremities (avoid face, groin, body folds) for the next 2 weeks, followed by taper to twice daily on  weekends only for persistent areas; the patient may repeat treatment in a 2-week burst-and-taper fashion every 6-8 weeks as needed for future flares.  I also emphasized the importance of dry, sensitive skin care, including the use of a mild soap, such as Dove, and frequent and aggressive moisturization, at least twice daily and immediately following showers or baths, with recommended over-the-counter moisturizing creams, such as Eucerin, Cetaphil, Cerave, or Aveeno, or Vaseline or Aquaphor ointments.  The risks, benefits, and side effects of this medication, including possible skin atrophy with overuse of topical steroids, were discussed.  The patient expressed understanding and is in agreement with this plan.  triamcinolone (Kenalog) 0.1 % cream - Right Thigh - Anterior  Apply twice daily to affected areas of legs (avoid face, groin, body folds) for 2 weeks, then taper to twice daily on weekends only; repeat every 6-8 weeks as needed for flares  4. MELANOCYTIC NEVUS OF TRUNK  Generalized  Scattered on the patient's face, neck, trunk, and extremities, there are multiple small, round- to oval-shaped, brown-pigmented and pink-colored, symmetric, uniform-appearing macules and dome-shaped papules  Clinically benign- to slightly atypical-appearing nevi - the clinically benign- to slightly atypical-appearing nature of the remainder of the patient's nevi was discussed with the patient today.  None of the patient's nevi, with the exception of the one noted above, meet threshold for biopsy today.  I emphasized the importance of performing monthly self-skin exams using the ABCDs of monitoring moles, which were reviewed with the patient today and an informational hand-out provided.  I also emphasized the importance of sun avoidance and sun protection with daily sunscreen use.  5. SEBORRHEIC KERATOSIS  Generalized  Scattered on the patient's face, neck, trunk, and extremities, there are multiple tan- to light brown-colored,  hyperkeratotic, stuck-on appearing papules of varying size and shape  Seborrheic Keratoses - the benign nature of these lesions was discussed with the patient today and reassurance provided.  No treatment is medically indicated for these lesions at this time.  6. HEMANGIOMA OF SKIN  Generalized  Scattered on the patient's face, neck, trunk, and extremities, there are multiple small, round, cherry red- to purplish-colored, symmetric, uniform, vascular-appearing macules and papules  Cherry Angiomas - the benign nature of these vascular lesions was discussed with the patient today and reassurance provided.  No treatment is medically indicated for these lesions at this time.  7. HISTORY OF MERKEL CELL CARCINOMA  Generalized  On his left forearm, there is a well-healed scar with no evidence of recurrent growth or any new lesions concerning for primary or recurrent group home on exam today.  On his left lateral proximal arm and left proximal dorsal hand, there are well-healed scars with no evidence of recurrent growth or pigmentation on exam today.  History of Merkel cell carcinoma, atypical junctional melanocytic proliferation, dysplastic nevus, and actinic keratoses photodamage.  There is no evidence of recurrence on exam today.  I emphasized the importance of continuing to perform monthly self-skin exams using the ABCDs of monitoring moles, which were reviewed with the patient, as well as the importance of sun avoidance and sun protection with daily sunscreen use.  I will have the patient return to our office in 3-4 months, pending the above biopsy results, for routine follow-up and skin exam and the patient was instructed to call our office should the patient notice any new, changing, symptomatic, or otherwise concerning skin lesions before then.  He was also instructed to continue to follow closely with his hematologist/oncologist for continued evaluation and management.  The patient expressed understanding and is in  agreement with this plan.  8. DIFFUSE PHOTODAMAGE OF SKIN  Photodistributed  Diffuse photodamage with actinic changes with telangiectasia and mottled pigmentation in sun-exposed areas.  Photodamage.  The signs and symptoms of skin cancer were reviewed and the patient was advised to practice sun protection and sun avoidance, use daily sunscreen, and perform regular self skin exams.  Sun protection was discussed, including avoiding the mid-day sun, wearing a sunscreen with SPF at least 50, and stressing the need for reapplication of sunscreen and applying more than they think they need.          [1]   Current Outpatient Medications:     ketoconazole (NIZOral) 2 % shampoo, Wash affected areas of scalp 2-3 times weekly as directed, Disp: 120 mL, Rfl: 11    levothyroxine (Synthroid) 88 mcg tablet, Take 1 tablet (88 mcg) by mouth once daily. Take on an empty stomach at the same time each day, either 30 to 60 minutes prior to breakfast, Disp: 30 tablet, Rfl: 3    ZINC ORAL, Take 1 tablet by mouth once daily. Zinc 140 mg (as elemental zinc 50 mg) oral tablet, Disp: , Rfl:     cholecalciferol (Vitamin D-3) 25 MCG (1000 UT) tablet, Take 1 tablet (25 mcg) by mouth once daily., Disp: , Rfl:     clindamycin (Cleocin T) 1 % lotion, Apply topically 2 times a day., Disp: 60 mL, Rfl: 11    glucosamine sulfate 500 mg capsule, Take 1 capsule by mouth once daily., Disp: , Rfl:     pravastatin (Pravachol) 40 mg tablet, Take 1 tablet (40 mg) by mouth once daily at bedtime., Disp: 90 tablet, Rfl: 1    triamcinolone (Kenalog) 0.1 % cream, Apply twice daily to affected areas of legs (avoid face, groin, body folds) for 2 weeks, then taper to twice daily on weekends only; repeat every 6-8 weeks as needed for flares, Disp: 80 g, Rfl: 1

## 2025-06-17 NOTE — Clinical Note
4 mm dark brown pigmented, asymmetric macule with an asymmetric pigment network and irregular borders

## 2025-06-17 NOTE — Clinical Note
2 cm dark brown pigmented, asymmetric patch with an asymmetric pigment network and irregular borders

## 2025-06-17 NOTE — Clinical Note
Nava Angiomas - the benign nature of these vascular lesions was discussed with the patient today and reassurance provided.  No treatment is medically indicated for these lesions at this time.

## 2025-06-17 NOTE — Clinical Note
Actinic Keratoses -scattered on face and scalp.  The pre-cancerous nature of these lesions and treatment options were discussed with the patient today.  At this time, I recommend treatment with liquid nitrogen cryotherapy.  The patient expressed understanding, is in agreement with this plan, and wishes to proceed with cryotherapy today.

## 2025-06-17 NOTE — Clinical Note
History of Merkel cell carcinoma, atypical junctional melanocytic proliferation, dysplastic nevus, and actinic keratoses photodamage.  There is no evidence of recurrence on exam today.  I emphasized the importance of continuing to perform monthly self-skin exams using the ABCDs of monitoring moles, which were reviewed with the patient, as well as the importance of sun avoidance and sun protection with daily sunscreen use.  I will have the patient return to our office in 3-4 months, pending the above biopsy results, for routine follow-up and skin exam and the patient was instructed to call our office should the patient notice any new, changing, symptomatic, or otherwise concerning skin lesions before then.  He was also instructed to continue to follow closely with his hematologist/oncologist for continued evaluation and management.  The patient expressed understanding and is in agreement with this plan.

## 2025-06-18 LAB
ALBUMIN SERPL-MCNC: 4.4 G/DL (ref 3.6–5.1)
ALP SERPL-CCNC: 66 U/L (ref 35–144)
ALT SERPL-CCNC: 22 U/L (ref 9–46)
ANION GAP SERPL CALCULATED.4IONS-SCNC: 6 MMOL/L (CALC) (ref 7–17)
AST SERPL-CCNC: 20 U/L (ref 10–35)
BILIRUB SERPL-MCNC: 0.7 MG/DL (ref 0.2–1.2)
BUN SERPL-MCNC: 18 MG/DL (ref 7–25)
CALCIUM SERPL-MCNC: 9.5 MG/DL (ref 8.6–10.3)
CHLORIDE SERPL-SCNC: 106 MMOL/L (ref 98–110)
CHOLEST SERPL-MCNC: 219 MG/DL
CHOLEST/HDLC SERPL: 4.7 (CALC)
CO2 SERPL-SCNC: 29 MMOL/L (ref 20–32)
CREAT SERPL-MCNC: 1.3 MG/DL (ref 0.7–1.28)
EGFRCR SERPLBLD CKD-EPI 2021: 57 ML/MIN/1.73M2
ERYTHROCYTE [DISTWIDTH] IN BLOOD BY AUTOMATED COUNT: 13.5 % (ref 11–15)
EST. AVERAGE GLUCOSE BLD GHB EST-MCNC: 123 MG/DL
EST. AVERAGE GLUCOSE BLD GHB EST-SCNC: 6.8 MMOL/L
GLUCOSE SERPL-MCNC: 110 MG/DL (ref 65–99)
HBA1C MFR BLD: 5.9 %
HCT VFR BLD AUTO: 54 % (ref 38.5–50)
HDLC SERPL-MCNC: 47 MG/DL
HGB BLD-MCNC: 17.3 G/DL (ref 13.2–17.1)
LDLC SERPL CALC-MCNC: 136 MG/DL (CALC)
MCH RBC QN AUTO: 30.2 PG (ref 27–33)
MCHC RBC AUTO-ENTMCNC: 32 G/DL (ref 32–36)
MCV RBC AUTO: 94.2 FL (ref 80–100)
NONHDLC SERPL-MCNC: 172 MG/DL (CALC)
PLATELET # BLD AUTO: 155 THOUSAND/UL (ref 140–400)
PMV BLD REES-ECKER: 10 FL (ref 7.5–12.5)
POTASSIUM SERPL-SCNC: 4.9 MMOL/L (ref 3.5–5.3)
PROT SERPL-MCNC: 6.9 G/DL (ref 6.1–8.1)
PSA SERPL-MCNC: <0.04 NG/ML
RBC # BLD AUTO: 5.73 MILLION/UL (ref 4.2–5.8)
SODIUM SERPL-SCNC: 141 MMOL/L (ref 135–146)
TRIGL SERPL-MCNC: 214 MG/DL
TSH SERPL-ACNC: 2.95 MIU/L (ref 0.4–4.5)
WBC # BLD AUTO: 4.3 THOUSAND/UL (ref 3.8–10.8)

## 2025-06-25 ENCOUNTER — APPOINTMENT (OUTPATIENT)
Dept: PRIMARY CARE | Facility: CLINIC | Age: 75
End: 2025-06-25
Payer: MEDICARE

## 2025-06-25 VITALS
SYSTOLIC BLOOD PRESSURE: 112 MMHG | BODY MASS INDEX: 33.33 KG/M2 | HEART RATE: 67 BPM | WEIGHT: 225 LBS | TEMPERATURE: 97.7 F | DIASTOLIC BLOOD PRESSURE: 60 MMHG | OXYGEN SATURATION: 96 % | HEIGHT: 69 IN

## 2025-06-25 DIAGNOSIS — E78.5 DYSLIPIDEMIA: ICD-10-CM

## 2025-06-25 DIAGNOSIS — E03.9 HYPOTHYROIDISM, UNSPECIFIED TYPE: ICD-10-CM

## 2025-06-25 PROCEDURE — 1159F MED LIST DOCD IN RCRD: CPT | Performed by: FAMILY MEDICINE

## 2025-06-25 PROCEDURE — 1158F ADVNC CARE PLAN TLK DOCD: CPT | Performed by: FAMILY MEDICINE

## 2025-06-25 PROCEDURE — 1036F TOBACCO NON-USER: CPT | Performed by: FAMILY MEDICINE

## 2025-06-25 PROCEDURE — 1123F ACP DISCUSS/DSCN MKR DOCD: CPT | Performed by: FAMILY MEDICINE

## 2025-06-25 PROCEDURE — 99214 OFFICE O/P EST MOD 30 MIN: CPT | Performed by: FAMILY MEDICINE

## 2025-06-25 RX ORDER — PRAVASTATIN SODIUM 40 MG/1
40 TABLET ORAL NIGHTLY
Qty: 90 TABLET | Refills: 1 | Status: SHIPPED | OUTPATIENT
Start: 2025-06-25 | End: 2025-12-22

## 2025-06-25 RX ORDER — LEVOTHYROXINE SODIUM 88 UG/1
88 TABLET ORAL DAILY
Qty: 90 TABLET | Refills: 1 | Status: SHIPPED | OUTPATIENT
Start: 2025-06-25 | End: 2025-12-22

## 2025-06-25 RX ORDER — UBIDECARENONE 100 MG
100 CAPSULE ORAL DAILY
COMMUNITY

## 2025-06-25 RX ORDER — VIT C/E/ZN/COPPR/LUTEIN/ZEAXAN 250MG-90MG
CAPSULE ORAL
COMMUNITY

## 2025-06-25 RX ORDER — IBUPROFEN 100 MG/5ML
1000 SUSPENSION, ORAL (FINAL DOSE FORM) ORAL DAILY
COMMUNITY

## 2025-06-25 ASSESSMENT — PATIENT HEALTH QUESTIONNAIRE - PHQ9
2. FEELING DOWN, DEPRESSED OR HOPELESS: NOT AT ALL
1. LITTLE INTEREST OR PLEASURE IN DOING THINGS: NOT AT ALL
SUM OF ALL RESPONSES TO PHQ9 QUESTIONS 1 AND 2: 0

## 2025-06-25 NOTE — PROGRESS NOTES
"Subjective   Patient ID: Konrad Camilo is a 74 y.o. male who presents for Med Refill. Patient had labs drawn prior to office visit.     HPI     Pt is here for refills of medications to treat the following medical conditions. Unless otherwise stated, these conditions are well-controlled, pt is taking medications as Rx, and there are no reported side effects to medications or other treatment: hypothyroidism, dyslipidemia.     Review of Systems   All other systems reviewed and are negative.      Objective   /60   Pulse 67   Temp 36.5 °C (97.7 °F)   Ht 1.74 m (5' 8.5\")   Wt 102 kg (225 lb)   SpO2 96%   BMI 33.71 kg/m²     Physical Exam  Vitals reviewed.   Constitutional:       General: He is awake.      Appearance: Normal appearance. He is well-developed.   HENT:      Head: Normocephalic and atraumatic.   Cardiovascular:      Rate and Rhythm: Normal rate.   Pulmonary:      Effort: Pulmonary effort is normal.   Musculoskeletal:      Cervical back: Full passive range of motion without pain.      Right lower leg: No edema.      Left lower leg: No edema.   Skin:     General: Skin is warm and dry.      Findings: No lesion or rash.   Neurological:      General: No focal deficit present.      Mental Status: He is alert and oriented to person, place, and time.      Cranial Nerves: No facial asymmetry.      Motor: Motor function is intact.      Gait: Gait is intact.   Psychiatric:         Attention and Perception: Attention normal.         Mood and Affect: Mood and affect normal.         Assessment/Plan   Diagnoses and all orders for this visit:  Dyslipidemia Poorly controlled)  -     pravastatin (Pravachol) 40 mg tablet; Take 1 tablet (40 mg) by mouth once daily at bedtime.  -     Lipid panel; Future -- he declines different statin or statin dose increase. He would like to \"adjust his diet\" and recheck in 3 months. Will follow.  Hypothyroidism, unspecified type  -     levothyroxine (Synthroid) 88 mcg tablet; Take " 1 tablet (88 mcg) by mouth once daily. Take on an empty stomach at the same time each day, either 30 to 60 minutes prior to breakfast

## 2025-10-22 ENCOUNTER — APPOINTMENT (OUTPATIENT)
Dept: DERMATOLOGY | Facility: CLINIC | Age: 75
End: 2025-10-22
Payer: MEDICARE

## 2025-12-15 ENCOUNTER — APPOINTMENT (OUTPATIENT)
Dept: PRIMARY CARE | Facility: CLINIC | Age: 75
End: 2025-12-15
Payer: MEDICARE